# Patient Record
Sex: MALE | Race: WHITE | NOT HISPANIC OR LATINO
[De-identification: names, ages, dates, MRNs, and addresses within clinical notes are randomized per-mention and may not be internally consistent; named-entity substitution may affect disease eponyms.]

---

## 2017-05-26 ENCOUNTER — APPOINTMENT (OUTPATIENT)
Dept: OTOLARYNGOLOGY | Facility: CLINIC | Age: 82
End: 2017-05-26

## 2017-05-26 VITALS
BODY MASS INDEX: 28.63 KG/M2 | WEIGHT: 200 LBS | DIASTOLIC BLOOD PRESSURE: 77 MMHG | HEIGHT: 70 IN | HEART RATE: 72 BPM | SYSTOLIC BLOOD PRESSURE: 112 MMHG

## 2017-08-04 ENCOUNTER — TRANSCRIPTION ENCOUNTER (OUTPATIENT)
Age: 82
End: 2017-08-04

## 2017-08-08 ENCOUNTER — APPOINTMENT (OUTPATIENT)
Dept: INTERNAL MEDICINE | Facility: CLINIC | Age: 82
End: 2017-08-08

## 2017-08-10 ENCOUNTER — APPOINTMENT (OUTPATIENT)
Dept: HEART AND VASCULAR | Facility: CLINIC | Age: 82
End: 2017-08-10
Payer: MEDICARE

## 2017-08-10 VITALS
TEMPERATURE: 98.2 F | OXYGEN SATURATION: 96 % | WEIGHT: 198.9 LBS | BODY MASS INDEX: 28.54 KG/M2 | HEART RATE: 80 BPM | RESPIRATION RATE: 14 BRPM | DIASTOLIC BLOOD PRESSURE: 89 MMHG | SYSTOLIC BLOOD PRESSURE: 149 MMHG

## 2017-08-10 DIAGNOSIS — I45.4 NONSPECIFIC INTRAVENTRICULAR BLOCK: ICD-10-CM

## 2017-08-10 PROCEDURE — 94644 CONT INHLJ TX 1ST HOUR: CPT

## 2017-08-10 PROCEDURE — 99203 OFFICE O/P NEW LOW 30 MIN: CPT | Mod: 25

## 2017-08-10 PROCEDURE — 93000 ELECTROCARDIOGRAM COMPLETE: CPT

## 2017-12-13 ENCOUNTER — APPOINTMENT (OUTPATIENT)
Dept: OTOLARYNGOLOGY | Facility: CLINIC | Age: 82
End: 2017-12-13
Payer: MEDICARE

## 2017-12-13 VITALS
HEART RATE: 56 BPM | SYSTOLIC BLOOD PRESSURE: 162 MMHG | BODY MASS INDEX: 29.2 KG/M2 | WEIGHT: 204 LBS | DIASTOLIC BLOOD PRESSURE: 78 MMHG | HEIGHT: 70 IN

## 2017-12-13 PROCEDURE — 92550 TYMPANOMETRY & REFLEX THRESH: CPT

## 2017-12-13 PROCEDURE — 99214 OFFICE O/P EST MOD 30 MIN: CPT | Mod: 25

## 2017-12-13 PROCEDURE — 31575 DIAGNOSTIC LARYNGOSCOPY: CPT

## 2017-12-13 PROCEDURE — 92557 COMPREHENSIVE HEARING TEST: CPT

## 2018-12-19 ENCOUNTER — APPOINTMENT (OUTPATIENT)
Dept: OTOLARYNGOLOGY | Facility: CLINIC | Age: 83
End: 2018-12-19
Payer: MEDICARE

## 2018-12-19 VITALS
SYSTOLIC BLOOD PRESSURE: 140 MMHG | HEART RATE: 56 BPM | WEIGHT: 204 LBS | DIASTOLIC BLOOD PRESSURE: 80 MMHG | BODY MASS INDEX: 29.2 KG/M2 | HEIGHT: 70 IN

## 2018-12-19 DIAGNOSIS — J18.9 PNEUMONIA, UNSPECIFIED ORGANISM: ICD-10-CM

## 2018-12-19 DIAGNOSIS — H90.3 SENSORINEURAL HEARING LOSS, BILATERAL: ICD-10-CM

## 2018-12-19 DIAGNOSIS — S40.029A CONTUSION OF UNSPECIFIED UPPER ARM, INITIAL ENCOUNTER: ICD-10-CM

## 2018-12-19 PROCEDURE — 99214 OFFICE O/P EST MOD 30 MIN: CPT | Mod: 25

## 2018-12-19 PROCEDURE — 31575 DIAGNOSTIC LARYNGOSCOPY: CPT

## 2018-12-26 PROBLEM — J18.9 CAP (COMMUNITY ACQUIRED PNEUMONIA): Status: RESOLVED | Noted: 2017-08-13 | Resolved: 2018-12-26

## 2019-01-01 ENCOUNTER — APPOINTMENT (OUTPATIENT)
Dept: OTOLARYNGOLOGY | Facility: CLINIC | Age: 84
End: 2019-01-01
Payer: MEDICARE

## 2019-01-01 VITALS
HEART RATE: 75 BPM | HEIGHT: 70 IN | DIASTOLIC BLOOD PRESSURE: 81 MMHG | WEIGHT: 189 LBS | SYSTOLIC BLOOD PRESSURE: 132 MMHG | BODY MASS INDEX: 27.06 KG/M2

## 2019-01-01 DIAGNOSIS — C32.9 MALIGNANT NEOPLASM OF LARYNX, UNSPECIFIED: ICD-10-CM

## 2019-01-01 DIAGNOSIS — J06.9 ACUTE UPPER RESPIRATORY INFECTION, UNSPECIFIED: ICD-10-CM

## 2019-01-01 DIAGNOSIS — Z87.09 PERSONAL HISTORY OF OTHER DISEASES OF THE RESPIRATORY SYSTEM: ICD-10-CM

## 2019-01-01 DIAGNOSIS — K21.9 GASTRO-ESOPHAGEAL REFLUX DISEASE W/OUT ESOPHAGITIS: ICD-10-CM

## 2019-01-01 DIAGNOSIS — E04.1 NONTOXIC SINGLE THYROID NODULE: ICD-10-CM

## 2019-01-01 DIAGNOSIS — C43.9 MALIGNANT MELANOMA OF SKIN, UNSPECIFIED: ICD-10-CM

## 2019-01-01 DIAGNOSIS — K21.0 GASTRO-ESOPHAGEAL REFLUX DISEASE WITH ESOPHAGITIS: ICD-10-CM

## 2019-01-01 PROCEDURE — 31575 DIAGNOSTIC LARYNGOSCOPY: CPT

## 2019-01-01 PROCEDURE — 99214 OFFICE O/P EST MOD 30 MIN: CPT | Mod: 25

## 2019-01-01 RX ORDER — PREDNISONE 10 MG/1
10 TABLET ORAL
Qty: 30 | Refills: 0 | Status: COMPLETED | COMMUNITY
Start: 2019-03-06 | End: 2019-03-19

## 2019-01-01 RX ORDER — AMOXICILLIN AND CLAVULANATE POTASSIUM 500; 125 MG/1; MG/1
500-125 TABLET, FILM COATED ORAL
Qty: 10 | Refills: 4 | Status: COMPLETED | COMMUNITY
Start: 2019-01-25 | End: 2019-02-08

## 2019-01-11 ENCOUNTER — TRANSCRIPTION ENCOUNTER (OUTPATIENT)
Age: 84
End: 2019-01-11

## 2019-01-18 ENCOUNTER — TRANSCRIPTION ENCOUNTER (OUTPATIENT)
Age: 84
End: 2019-01-18

## 2019-01-25 ENCOUNTER — APPOINTMENT (OUTPATIENT)
Dept: INTERNAL MEDICINE | Facility: CLINIC | Age: 84
End: 2019-01-25
Payer: MEDICARE

## 2019-01-25 VITALS
DIASTOLIC BLOOD PRESSURE: 80 MMHG | SYSTOLIC BLOOD PRESSURE: 140 MMHG | HEART RATE: 65 BPM | OXYGEN SATURATION: 97 % | RESPIRATION RATE: 14 BRPM

## 2019-01-25 PROCEDURE — 99215 OFFICE O/P EST HI 40 MIN: CPT

## 2019-01-27 NOTE — HISTORY OF PRESENT ILLNESS
[de-identified] : 82 yo M with hx of throat CA s/p tx with RT, Afib, \par s/p urgent care eval 2 weeks ago for wheezing\par  Has right sided throat pain.  Saw Dr Champion last month- advised to gargle with hot water.\par Has been sleeping poorly , up 2 years\par reports a nebulizer did help\par  tried albuterol but it made him dizzy\par Quit tob 14 yrs go.

## 2019-01-27 NOTE — PHYSICAL EXAM
[No Acute Distress] : no acute distress [Well Nourished] : well nourished [Well Developed] : well developed [Supple] : supple [No Lymphadenopathy] : no lymphadenopathy [No Respiratory Distress] : no respiratory distress  [Clear to Auscultation] : lungs were clear to auscultation bilaterally [No Accessory Muscle Use] : no accessory muscle use [Normal Rate] : normal rate  [Regular Rhythm] : with a regular rhythm [Normal S1, S2] : normal S1 and S2 [Soft] : abdomen soft [Non Tender] : non-tender [Non-distended] : non-distended [No HSM] : no HSM [Normal Bowel Sounds] : normal bowel sounds [No Rash] : no rash [Normal Affect] : the affect was normal [Normal Insight/Judgement] : insight and judgment were intact [de-identified] : mild pharyngeal erythema [de-identified] : trace edema b/l le

## 2019-01-27 NOTE — PLAN
[FreeTextEntry1] : HTN - controlled on current regimen\par HLD cont statin\par Afib on amiodarone, ac with Eliquis\par Breathing discomfort- likely underlying emphysema- trial of flovent and monitor symptoms\par f/up 3 months time

## 2019-01-27 NOTE — REVIEW OF SYSTEMS
[Fatigue] : fatigue [Sore Throat] : sore throat [Joint Pain] : joint pain [Dizziness] : dizziness [Insomnia] : insomnia [Negative] : Gastrointestinal

## 2019-03-03 ENCOUNTER — FORM ENCOUNTER (OUTPATIENT)
Age: 84
End: 2019-03-03

## 2019-03-04 ENCOUNTER — APPOINTMENT (OUTPATIENT)
Dept: INTERNAL MEDICINE | Facility: CLINIC | Age: 84
End: 2019-03-04
Payer: MEDICARE

## 2019-03-04 ENCOUNTER — OUTPATIENT (OUTPATIENT)
Dept: OUTPATIENT SERVICES | Facility: HOSPITAL | Age: 84
LOS: 1 days | End: 2019-03-04
Payer: MEDICARE

## 2019-03-04 VITALS
HEIGHT: 70 IN | HEART RATE: 66 BPM | RESPIRATION RATE: 14 BRPM | DIASTOLIC BLOOD PRESSURE: 79 MMHG | WEIGHT: 204 LBS | SYSTOLIC BLOOD PRESSURE: 132 MMHG | BODY MASS INDEX: 29.2 KG/M2 | OXYGEN SATURATION: 97 % | TEMPERATURE: 98.1 F

## 2019-03-04 PROCEDURE — 71046 X-RAY EXAM CHEST 2 VIEWS: CPT

## 2019-03-04 PROCEDURE — 71046 X-RAY EXAM CHEST 2 VIEWS: CPT | Mod: 26

## 2019-03-04 PROCEDURE — 99214 OFFICE O/P EST MOD 30 MIN: CPT

## 2019-03-04 NOTE — HISTORY OF PRESENT ILLNESS
[FreeTextEntry1] : sob and fatigue [de-identified] : \par \par Has seen Dr Champion and has been to urgent care twice.  Has had a cough, fatigue., was taking mucinex,   CXR at urgent care was clear.  \par Having a lot of mucous.\par SLeeping a lot.  \par Recently saw Dr Parada and started on lasix 40mg daily. Labs performed.  \par Appetite is poor - Has lost 4 lbs.  over 3 months. \par \par

## 2019-03-04 NOTE — PLAN
[FreeTextEntry1] : Suspect a degree of underlying emphysema\par  - f/up Echo and f/up with Dr Parada\par  - send for CXR\par  - Pulmonary evaluation\par  - Labs reviewed  - outside of elev BNP - no sig findings\par  - ok to add ensure to daily meals - 1can for now\par

## 2019-03-04 NOTE — PHYSICAL EXAM
[No Acute Distress] : no acute distress [Well Nourished] : well nourished [Well Developed] : well developed [Normal Oropharynx] : the oropharynx was normal [Supple] : supple [No Lymphadenopathy] : no lymphadenopathy [No Respiratory Distress] : no respiratory distress  [Normal Rate] : normal rate  [Normal S1, S2] : normal S1 and S2 [Normal Affect] : the affect was normal [Normal Insight/Judgement] : insight and judgment were intact [de-identified] : coarse breath sounds b/l with dec breath sounds at bases [de-identified] : irreg irreg [de-identified] : 1+ edema right le  trace lle [de-identified] : abnormal gait unsteady

## 2019-03-04 NOTE — REVIEW OF SYSTEMS
[Fever] : no fever [Chills] : no chills [Fatigue] : fatigue [Recent Change In Weight] : ~T recent weight change [Cough] : cough [Dyspnea on Exertion] : dyspnea on exertion [Incontinence] : no incontinence [Frequency] : frequency [Joint Pain] : joint pain [Unsteady Walk] : ataxia [Negative] : Psychiatric

## 2019-03-06 ENCOUNTER — APPOINTMENT (OUTPATIENT)
Dept: PULMONOLOGY | Facility: CLINIC | Age: 84
End: 2019-03-06
Payer: MEDICARE

## 2019-03-06 VITALS
WEIGHT: 204 LBS | HEIGHT: 70 IN | DIASTOLIC BLOOD PRESSURE: 82 MMHG | TEMPERATURE: 97.7 F | SYSTOLIC BLOOD PRESSURE: 138 MMHG | OXYGEN SATURATION: 91 % | HEART RATE: 74 BPM | BODY MASS INDEX: 29.2 KG/M2

## 2019-03-06 PROCEDURE — 99204 OFFICE O/P NEW MOD 45 MIN: CPT | Mod: 25

## 2019-03-06 PROCEDURE — 94010 BREATHING CAPACITY TEST: CPT

## 2019-03-07 NOTE — HISTORY OF PRESENT ILLNESS
[FreeTextEntry1] : a lot of phlem  yellow,  was a smoker for hundred years.  .  poor history and wife is not that much better.  really cannot give a history, so history obtrained from the chart.  cardiac issues,  demential large history of smoking with mostly mucous and cough likley after a resp infection

## 2019-03-07 NOTE — REASON FOR VISIT
[Follow-Up] : a follow-up visit [FreeTextEntry1] : went to urgent care in january,  breathing was bad  gave him nebulizer

## 2019-03-07 NOTE — DISCUSSION/SUMMARY
[FreeTextEntry1] : likely just post viral cough \par \par surprisingly, no clear indica tion off cope and good oxygenation\par \par will give a 12 day course of prednisone\par \par does not appear that there is an ongoing in fection.

## 2019-03-07 NOTE — PHYSICAL EXAM
[Normal Oropharynx] : normal oropharynx [Neck Appearance] : the appearance of the neck was normal [Neck Cervical Mass (___cm)] : no neck mass was observed [Jugular Venous Distention Increased] : there was no jugular-venous distention [Thyroid Diffuse Enlargement] : the thyroid was not enlarged [Thyroid Nodule] : there were no palpable thyroid nodules [Kyphosis] : kyphosis [Nail Clubbing] : no clubbing of the fingernails [Cyanosis, Localized] : no localized cyanosis [Petechial Hemorrhages (___cm)] : no petechial hemorrhages [] : no ischemic changes [No Venous Stasis] : no venous stasis [Deep Tendon Reflexes (DTR)] : deep tendon reflexes were 2+ and symmetric [Sensation] : the sensory exam was normal to light touch and pinprick [No Focal Deficits] : no focal deficits [FreeTextEntry1] : dmentia

## 2019-03-07 NOTE — PROCEDURE
[FreeTextEntry1] : poor effor on spirometry but there is no pattern reflective of copd\par \par chest film eh, but no inifltrates or effusions

## 2019-12-26 PROBLEM — Z87.09 HISTORY OF ACUTE BRONCHITIS: Status: RESOLVED | Noted: 2019-03-06 | Resolved: 2019-01-01

## 2019-12-26 PROBLEM — K21.0 CHRONIC REFLUX ESOPHAGITIS: Status: ACTIVE | Noted: 2017-12-14

## 2019-12-26 PROBLEM — J06.9 ACUTE URI: Status: RESOLVED | Noted: 2018-12-26 | Resolved: 2019-01-01

## 2019-12-26 NOTE — PHYSICAL EXAM
[] : septum deviated to the left [Midline] : trachea located in midline position [Laryngoscopy Performed] : laryngoscopy was performed, see procedure section for findings [Normal] : no neck adenopathy [FreeTextEntry1] : Voice baseline hoarseness.  No stridor. [de-identified] : 1+ bilateral

## 2019-12-26 NOTE — CONSULT LETTER
[Dear  ___] : Dear  [unfilled], [Courtesy Letter:] : I had the pleasure of seeing your patient, [unfilled], in my office today. [Consult Closing:] : Thank you very much for allowing me to participate in the care of this patient.  If you have any questions, please do not hesitate to contact me. [Sincerely,] : Sincerely, [FreeTextEntry2] : Stu Forbes MD\par [address]\par [address]\[place] [address] [FreeTextEntry1] : \par \par Enclosed please find my office notes for December 18, 2019. \par \par \par \par \par \par  [FreeTextEntry3] : \par Love Champion MD \par Otolaryngology, Head and Neck Surgery \par Residency site , [facility] \par

## 2019-12-26 NOTE — DATA REVIEWED
[de-identified] : \par AUDIOGRAM (2017)\par BILATERAL Mild to severe SNHL\par (compared to last  10/24/14, hearing levels stable AU)\par Tympanograms  A  bilateral    \par Word recognition 90%  bilateral \par

## 2019-12-26 NOTE — ASSESSMENT
[FreeTextEntry1] : Mr. NAIK had the following issues addressed today:\par \par 1.) No evidence of recurrent laryngeal SCCa at 6 1/2 years post RT.  No evidence of new primary in H&N. \par 2.) Reflux not frequently symptomatic, per patient\par 3.) Hx of melanoma on back\par 4.) Hx of right thyroid nodule, 2.3 cm\par \par Plan:\par -- Continue annual surveillance\par -- F/up with dermatologist\par -- US thyroid to assess nodule\par \par Return in 1 year

## 2019-12-26 NOTE — HISTORY OF PRESENT ILLNESS
[de-identified] : Mr. NAIK  is a 86 year old M being seen today in f/u for laryngeal cancer.\par He was accompanied by his wife who contributed to history.\par \par He is still frequent clear mucus in his throat but less than in past.\par Denies trouble breathing through his nose. No trouble swallowing, voice change or throat pain.\par No heartburn.  No reflux meds. 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 Chong in early 2017.\par Has not gone to dermatologist for almost a year now.\par \par \par Right posterior thyroid nodule, 2.5 x 2.3 x 2.3 CM, was evaluated in 2013 and he had benign FNA.\par Nodule has not been palpate in past.\par Euthyroid.  No FH thyroid CA.\par \par No major medical history changes since last year. \par Grandson was recently diagnosed with autism.\par

## 2019-12-26 NOTE — PROCEDURE
[de-identified] : \par Indication: GERD, laryngeal SCCa\par Topical Honorio-Synephrine and lidocaine 2% were applied to the nasal cavities.\par Flexible laryngoscopy was performed via the right nasal cavity and revealed the following:\par  -- Nasopharynx had no mass or exudate.\par  -- Base of tongue was symmetric and not enlarged.\par  -- Vallecula was clear\par  -- Epiglottis mild edema.  Both aryepiglottic folds and both false vocal folds were normal\par  -- Arytenoids both with mild edema and no erythema \par  -- True vocal folds were fully mobile and without lesions; mild edema both TVFs \par  -- Minimal clear mucus in hypopharynx.\par  -- Post cricoid area was clear.\par  -- Interarytenoid edema was present\par \par The patient tolerated the procedure well. \par

## 2020-01-01 ENCOUNTER — APPOINTMENT (OUTPATIENT)
Dept: OPHTHALMOLOGY | Facility: CLINIC | Age: 85
End: 2020-01-01
Payer: MEDICARE

## 2020-01-01 ENCOUNTER — APPOINTMENT (OUTPATIENT)
Dept: UROLOGY | Facility: CLINIC | Age: 85
End: 2020-01-01
Payer: MEDICARE

## 2020-01-01 ENCOUNTER — OUTPATIENT (OUTPATIENT)
Dept: OUTPATIENT SERVICES | Facility: HOSPITAL | Age: 85
LOS: 1 days | End: 2020-01-01
Payer: MEDICARE

## 2020-01-01 ENCOUNTER — APPOINTMENT (OUTPATIENT)
Dept: OPHTHALMOLOGY | Facility: CLINIC | Age: 85
End: 2020-01-01

## 2020-01-01 ENCOUNTER — FORM ENCOUNTER (OUTPATIENT)
Age: 85
End: 2020-01-01

## 2020-01-01 ENCOUNTER — NON-APPOINTMENT (OUTPATIENT)
Age: 85
End: 2020-01-01

## 2020-01-01 ENCOUNTER — TRANSCRIPTION ENCOUNTER (OUTPATIENT)
Age: 85
End: 2020-01-01

## 2020-01-01 ENCOUNTER — RESULT REVIEW (OUTPATIENT)
Age: 85
End: 2020-01-01

## 2020-01-01 ENCOUNTER — APPOINTMENT (OUTPATIENT)
Dept: HEART AND VASCULAR | Facility: CLINIC | Age: 85
End: 2020-01-01
Payer: MEDICARE

## 2020-01-01 ENCOUNTER — INPATIENT (INPATIENT)
Facility: HOSPITAL | Age: 85
LOS: 3 days | Discharge: HOSPICE | DRG: 180 | End: 2020-11-06
Attending: HOSPITALIST | Admitting: HOSPITALIST
Payer: MEDICARE

## 2020-01-01 ENCOUNTER — APPOINTMENT (OUTPATIENT)
Dept: INTERNAL MEDICINE | Facility: CLINIC | Age: 85
End: 2020-01-01

## 2020-01-01 ENCOUNTER — APPOINTMENT (OUTPATIENT)
Dept: INTERNAL MEDICINE | Facility: CLINIC | Age: 85
End: 2020-01-01
Payer: MEDICARE

## 2020-01-01 ENCOUNTER — APPOINTMENT (OUTPATIENT)
Dept: OPHTHALMOLOGY | Facility: AMBULATORY SURGERY CENTER | Age: 85
End: 2020-01-01

## 2020-01-01 ENCOUNTER — APPOINTMENT (OUTPATIENT)
Dept: HEART AND VASCULAR | Facility: CLINIC | Age: 85
End: 2020-01-01

## 2020-01-01 ENCOUNTER — OUTPATIENT (OUTPATIENT)
Dept: OUTPATIENT SERVICES | Facility: HOSPITAL | Age: 85
LOS: 1 days | Discharge: HOME | End: 2020-01-01
Payer: MEDICARE

## 2020-01-01 ENCOUNTER — APPOINTMENT (OUTPATIENT)
Dept: OPHTHALMOLOGY | Facility: AMBULATORY SURGERY CENTER | Age: 85
End: 2020-01-01
Payer: MEDICARE

## 2020-01-01 ENCOUNTER — APPOINTMENT (OUTPATIENT)
Dept: UROLOGY | Facility: CLINIC | Age: 85
End: 2020-01-01

## 2020-01-01 ENCOUNTER — APPOINTMENT (OUTPATIENT)
Dept: DISASTER EMERGENCY | Facility: CLINIC | Age: 85
End: 2020-01-01

## 2020-01-01 ENCOUNTER — APPOINTMENT (OUTPATIENT)
Dept: PULMONOLOGY | Facility: CLINIC | Age: 85
End: 2020-01-01

## 2020-01-01 ENCOUNTER — APPOINTMENT (OUTPATIENT)
Dept: ULTRASOUND IMAGING | Facility: HOSPITAL | Age: 85
End: 2020-01-01
Payer: MEDICARE

## 2020-01-01 ENCOUNTER — EMERGENCY (EMERGENCY)
Facility: HOSPITAL | Age: 85
LOS: 1 days | Discharge: HOME | End: 2020-01-01
Attending: EMERGENCY MEDICINE | Admitting: EMERGENCY MEDICINE
Payer: MEDICARE

## 2020-01-01 VITALS
WEIGHT: 175 LBS | RESPIRATION RATE: 14 BRPM | SYSTOLIC BLOOD PRESSURE: 144 MMHG | HEIGHT: 70 IN | TEMPERATURE: 98.6 F | BODY MASS INDEX: 25.05 KG/M2 | DIASTOLIC BLOOD PRESSURE: 70 MMHG

## 2020-01-01 VITALS
DIASTOLIC BLOOD PRESSURE: 54 MMHG | OXYGEN SATURATION: 90 % | TEMPERATURE: 98 F | HEART RATE: 50 BPM | RESPIRATION RATE: 17 BRPM | SYSTOLIC BLOOD PRESSURE: 102 MMHG

## 2020-01-01 VITALS
SYSTOLIC BLOOD PRESSURE: 130 MMHG | HEIGHT: 70 IN | DIASTOLIC BLOOD PRESSURE: 76 MMHG | HEART RATE: 64 BPM | WEIGHT: 185 LBS | TEMPERATURE: 97 F | OXYGEN SATURATION: 97 % | RESPIRATION RATE: 14 BRPM | BODY MASS INDEX: 26.48 KG/M2

## 2020-01-01 VITALS
SYSTOLIC BLOOD PRESSURE: 127 MMHG | DIASTOLIC BLOOD PRESSURE: 69 MMHG | WEIGHT: 189 LBS | HEART RATE: 65 BPM | BODY MASS INDEX: 27.06 KG/M2 | HEIGHT: 70 IN

## 2020-01-01 VITALS
WEIGHT: 179.9 LBS | DIASTOLIC BLOOD PRESSURE: 70 MMHG | HEIGHT: 71 IN | RESPIRATION RATE: 18 BRPM | TEMPERATURE: 98 F | SYSTOLIC BLOOD PRESSURE: 123 MMHG | OXYGEN SATURATION: 96 % | HEART RATE: 66 BPM

## 2020-01-01 VITALS
OXYGEN SATURATION: 98 % | BODY MASS INDEX: 26.05 KG/M2 | SYSTOLIC BLOOD PRESSURE: 126 MMHG | RESPIRATION RATE: 14 BRPM | TEMPERATURE: 97.6 F | HEIGHT: 70 IN | DIASTOLIC BLOOD PRESSURE: 62 MMHG | HEART RATE: 79 BPM | WEIGHT: 182 LBS

## 2020-01-01 VITALS
WEIGHT: 171.96 LBS | TEMPERATURE: 98 F | SYSTOLIC BLOOD PRESSURE: 137 MMHG | HEIGHT: 71 IN | HEART RATE: 102 BPM | OXYGEN SATURATION: 95 % | RESPIRATION RATE: 20 BRPM | DIASTOLIC BLOOD PRESSURE: 76 MMHG

## 2020-01-01 VITALS — DIASTOLIC BLOOD PRESSURE: 69 MMHG | HEART RATE: 71 BPM | SYSTOLIC BLOOD PRESSURE: 118 MMHG | TEMPERATURE: 97.8 F

## 2020-01-01 VITALS
SYSTOLIC BLOOD PRESSURE: 131 MMHG | RESPIRATION RATE: 18 BRPM | HEART RATE: 86 BPM | OXYGEN SATURATION: 96 % | DIASTOLIC BLOOD PRESSURE: 75 MMHG | TEMPERATURE: 99 F

## 2020-01-01 VITALS
OXYGEN SATURATION: 98 % | DIASTOLIC BLOOD PRESSURE: 71 MMHG | SYSTOLIC BLOOD PRESSURE: 127 MMHG | TEMPERATURE: 97.3 F | HEART RATE: 77 BPM

## 2020-01-01 DIAGNOSIS — R63.0 ANOREXIA: ICD-10-CM

## 2020-01-01 DIAGNOSIS — I42.9 CARDIOMYOPATHY, UNSPECIFIED: ICD-10-CM

## 2020-01-01 DIAGNOSIS — I44.7 LEFT BUNDLE-BRANCH BLOCK, UNSPECIFIED: ICD-10-CM

## 2020-01-01 DIAGNOSIS — I48.91 UNSPECIFIED ATRIAL FIBRILLATION: ICD-10-CM

## 2020-01-01 DIAGNOSIS — R91.8 OTHER NONSPECIFIC ABNORMAL FINDING OF LUNG FIELD: ICD-10-CM

## 2020-01-01 DIAGNOSIS — K76.9 LIVER DISEASE, UNSPECIFIED: ICD-10-CM

## 2020-01-01 DIAGNOSIS — R63.4 ABNORMAL WEIGHT LOSS: ICD-10-CM

## 2020-01-01 DIAGNOSIS — R06.00 DYSPNEA, UNSPECIFIED: ICD-10-CM

## 2020-01-01 DIAGNOSIS — I11.0 HYPERTENSIVE HEART DISEASE WITH HEART FAILURE: ICD-10-CM

## 2020-01-01 DIAGNOSIS — R63.8 OTHER SYMPTOMS AND SIGNS CONCERNING FOOD AND FLUID INTAKE: ICD-10-CM

## 2020-01-01 DIAGNOSIS — Z66 DO NOT RESUSCITATE: ICD-10-CM

## 2020-01-01 DIAGNOSIS — R10.2 PELVIC AND PERINEAL PAIN: ICD-10-CM

## 2020-01-01 DIAGNOSIS — I50.22 CHRONIC SYSTOLIC (CONGESTIVE) HEART FAILURE: ICD-10-CM

## 2020-01-01 DIAGNOSIS — J90 PLEURAL EFFUSION, NOT ELSEWHERE CLASSIFIED: ICD-10-CM

## 2020-01-01 DIAGNOSIS — I10 ESSENTIAL (PRIMARY) HYPERTENSION: ICD-10-CM

## 2020-01-01 DIAGNOSIS — Z00.00 ENCOUNTER FOR GENERAL ADULT MEDICAL EXAMINATION W/OUT ABNORMAL FINDINGS: ICD-10-CM

## 2020-01-01 DIAGNOSIS — E44.1 MILD PROTEIN-CALORIE MALNUTRITION: ICD-10-CM

## 2020-01-01 DIAGNOSIS — G89.3 NEOPLASM RELATED PAIN (ACUTE) (CHRONIC): ICD-10-CM

## 2020-01-01 DIAGNOSIS — R21 RASH AND OTHER NONSPECIFIC SKIN ERUPTION: ICD-10-CM

## 2020-01-01 DIAGNOSIS — E87.1 HYPO-OSMOLALITY AND HYPONATREMIA: ICD-10-CM

## 2020-01-01 DIAGNOSIS — R53.83 OTHER FATIGUE: ICD-10-CM

## 2020-01-01 DIAGNOSIS — B35.6 TINEA CRURIS: ICD-10-CM

## 2020-01-01 DIAGNOSIS — M54.9 DORSALGIA, UNSPECIFIED: ICD-10-CM

## 2020-01-01 DIAGNOSIS — Z87.891 PERSONAL HISTORY OF NICOTINE DEPENDENCE: ICD-10-CM

## 2020-01-01 DIAGNOSIS — E78.5 HYPERLIPIDEMIA, UNSPECIFIED: ICD-10-CM

## 2020-01-01 DIAGNOSIS — J96.01 ACUTE RESPIRATORY FAILURE WITH HYPOXIA: ICD-10-CM

## 2020-01-01 DIAGNOSIS — J91.0 MALIGNANT PLEURAL EFFUSION: ICD-10-CM

## 2020-01-01 DIAGNOSIS — Z51.5 ENCOUNTER FOR PALLIATIVE CARE: ICD-10-CM

## 2020-01-01 DIAGNOSIS — R74.8 ABNORMAL LEVELS OF OTHER SERUM ENZYMES: ICD-10-CM

## 2020-01-01 DIAGNOSIS — C34.90 MALIGNANT NEOPLASM OF UNSPECIFIED PART OF UNSPECIFIED BRONCHUS OR LUNG: ICD-10-CM

## 2020-01-01 DIAGNOSIS — Z79.01 LONG TERM (CURRENT) USE OF ANTICOAGULANTS: ICD-10-CM

## 2020-01-01 DIAGNOSIS — Z71.89 OTHER SPECIFIED COUNSELING: ICD-10-CM

## 2020-01-01 DIAGNOSIS — I49.3 VENTRICULAR PREMATURE DEPOLARIZATION: ICD-10-CM

## 2020-01-01 DIAGNOSIS — Z01.818 ENCOUNTER FOR OTHER PREPROCEDURAL EXAMINATION: ICD-10-CM

## 2020-01-01 DIAGNOSIS — I48.19 OTHER PERSISTENT ATRIAL FIBRILLATION: ICD-10-CM

## 2020-01-01 DIAGNOSIS — R35.1 BENIGN PROSTATIC HYPERPLASIA WITH LOWER URINARY TRACT SYMPMS: ICD-10-CM

## 2020-01-01 DIAGNOSIS — R53.1 WEAKNESS: ICD-10-CM

## 2020-01-01 DIAGNOSIS — Z78.9 OTHER SPECIFIED HEALTH STATUS: ICD-10-CM

## 2020-01-01 DIAGNOSIS — N40.1 BENIGN PROSTATIC HYPERPLASIA WITH LOWER URINARY TRACT SYMPMS: ICD-10-CM

## 2020-01-01 DIAGNOSIS — C78.00 SECONDARY MALIGNANT NEOPLASM OF UNSPECIFIED LUNG: ICD-10-CM

## 2020-01-01 LAB
AFP-TM SERPL-MCNC: <1.8 NG/ML — SIGNIFICANT CHANGE UP
ALBUMIN FLD-MCNC: 2.8 G/DL — SIGNIFICANT CHANGE UP
ALBUMIN SERPL ELPH-MCNC: 3.2 G/DL — LOW (ref 3.3–5)
ALBUMIN SERPL ELPH-MCNC: 3.2 G/DL — LOW (ref 3.3–5)
ALBUMIN SERPL ELPH-MCNC: 3.3 G/DL — SIGNIFICANT CHANGE UP (ref 3.3–5)
ALBUMIN SERPL ELPH-MCNC: 3.8 G/DL — SIGNIFICANT CHANGE UP (ref 3.3–5)
ALBUMIN SERPL ELPH-MCNC: 3.9 G/DL
ALP BLD-CCNC: 271 U/L
ALP SERPL-CCNC: 112 U/L — SIGNIFICANT CHANGE UP (ref 40–120)
ALP SERPL-CCNC: 330 U/L — HIGH (ref 40–120)
ALP SERPL-CCNC: 335 U/L — HIGH (ref 40–120)
ALP SERPL-CCNC: 369 U/L — HIGH (ref 40–120)
ALT FLD-CCNC: 11 U/L — SIGNIFICANT CHANGE UP (ref 10–45)
ALT FLD-CCNC: 21 U/L — SIGNIFICANT CHANGE UP (ref 10–45)
ALT FLD-CCNC: 21 U/L — SIGNIFICANT CHANGE UP (ref 10–45)
ALT FLD-CCNC: 22 U/L — SIGNIFICANT CHANGE UP (ref 10–45)
ALT SERPL-CCNC: 21 U/L
ANION GAP SERPL CALC-SCNC: 12 MMOL/L — SIGNIFICANT CHANGE UP (ref 5–17)
ANION GAP SERPL CALC-SCNC: 13 MMOL/L
ANION GAP SERPL CALC-SCNC: 14 MMOL/L — SIGNIFICANT CHANGE UP (ref 5–17)
ANION GAP SERPL CALC-SCNC: 15 MMOL/L — SIGNIFICANT CHANGE UP (ref 5–17)
ANION GAP SERPL CALC-SCNC: 16 MMOL/L
ANION GAP SERPL CALC-SCNC: 16 MMOL/L — SIGNIFICANT CHANGE UP (ref 5–17)
ANION GAP SERPL CALC-SCNC: 17 MMOL/L — SIGNIFICANT CHANGE UP (ref 5–17)
APPEARANCE: CLEAR
APTT BLD: 28.7 SEC — SIGNIFICANT CHANGE UP (ref 27.5–35.5)
APTT BLD: 30.2 SEC — SIGNIFICANT CHANGE UP (ref 27.5–35.5)
AST SERPL-CCNC: 25 U/L — SIGNIFICANT CHANGE UP (ref 10–40)
AST SERPL-CCNC: 34 U/L — SIGNIFICANT CHANGE UP (ref 10–40)
AST SERPL-CCNC: 36 U/L — SIGNIFICANT CHANGE UP (ref 10–40)
AST SERPL-CCNC: 36 U/L — SIGNIFICANT CHANGE UP (ref 10–40)
AST SERPL-CCNC: 38 U/L
B PERT IGG+IGM PNL SER: SIGNIFICANT CHANGE UP
B-OH-BUTYR SERPL-SCNC: 0.6 MMOL/L — HIGH
BASOPHILS # BLD AUTO: 0.03 K/UL — SIGNIFICANT CHANGE UP (ref 0–0.2)
BASOPHILS # BLD AUTO: 0.04 K/UL — SIGNIFICANT CHANGE UP (ref 0–0.2)
BASOPHILS # BLD AUTO: 0.05 K/UL
BASOPHILS # BLD AUTO: 0.05 K/UL — SIGNIFICANT CHANGE UP (ref 0–0.2)
BASOPHILS NFR BLD AUTO: 0.2 % — SIGNIFICANT CHANGE UP (ref 0–2)
BASOPHILS NFR BLD AUTO: 0.3 % — SIGNIFICANT CHANGE UP (ref 0–2)
BASOPHILS NFR BLD AUTO: 0.4 % — SIGNIFICANT CHANGE UP (ref 0–2)
BASOPHILS NFR BLD AUTO: 0.5 %
BILIRUB SERPL-MCNC: 0.4 MG/DL
BILIRUB SERPL-MCNC: 0.4 MG/DL — SIGNIFICANT CHANGE UP (ref 0.2–1.2)
BILIRUB SERPL-MCNC: 0.5 MG/DL — SIGNIFICANT CHANGE UP (ref 0.2–1.2)
BILIRUBIN URINE: NEGATIVE
BLOOD URINE: NEGATIVE
BUN SERPL-MCNC: 15 MG/DL — SIGNIFICANT CHANGE UP (ref 7–23)
BUN SERPL-MCNC: 16 MG/DL — SIGNIFICANT CHANGE UP (ref 7–23)
BUN SERPL-MCNC: 18 MG/DL — SIGNIFICANT CHANGE UP (ref 7–23)
BUN SERPL-MCNC: 18 MG/DL — SIGNIFICANT CHANGE UP (ref 7–23)
BUN SERPL-MCNC: 19 MG/DL
BUN SERPL-MCNC: 22 MG/DL
BUN SERPL-MCNC: 22 MG/DL — SIGNIFICANT CHANGE UP (ref 7–23)
CALCIUM SERPL-MCNC: 9 MG/DL — SIGNIFICANT CHANGE UP (ref 8.4–10.5)
CALCIUM SERPL-MCNC: 9.2 MG/DL
CALCIUM SERPL-MCNC: 9.2 MG/DL — SIGNIFICANT CHANGE UP (ref 8.4–10.5)
CALCIUM SERPL-MCNC: 9.2 MG/DL — SIGNIFICANT CHANGE UP (ref 8.4–10.5)
CALCIUM SERPL-MCNC: 9.4 MG/DL — SIGNIFICANT CHANGE UP (ref 8.4–10.5)
CALCIUM SERPL-MCNC: 9.6 MG/DL — SIGNIFICANT CHANGE UP (ref 8.4–10.5)
CALCIUM SERPL-MCNC: 9.7 MG/DL
CHLORIDE SERPL-SCNC: 101 MMOL/L — SIGNIFICANT CHANGE UP (ref 96–108)
CHLORIDE SERPL-SCNC: 104 MMOL/L
CHLORIDE SERPL-SCNC: 93 MMOL/L — LOW (ref 96–108)
CHLORIDE SERPL-SCNC: 94 MMOL/L — LOW (ref 96–108)
CHLORIDE SERPL-SCNC: 94 MMOL/L — LOW (ref 96–108)
CHLORIDE SERPL-SCNC: 96 MMOL/L
CHLORIDE SERPL-SCNC: 96 MMOL/L — SIGNIFICANT CHANGE UP (ref 96–108)
CHOLEST FLD-MCNC: 55 MG/DL — SIGNIFICANT CHANGE UP
CO2 SERPL-SCNC: 24 MMOL/L — SIGNIFICANT CHANGE UP (ref 22–31)
CO2 SERPL-SCNC: 25 MMOL/L
CO2 SERPL-SCNC: 25 MMOL/L — SIGNIFICANT CHANGE UP (ref 22–31)
CO2 SERPL-SCNC: 26 MMOL/L
CO2 SERPL-SCNC: 26 MMOL/L — SIGNIFICANT CHANGE UP (ref 22–31)
CO2 SERPL-SCNC: 27 MMOL/L — SIGNIFICANT CHANGE UP (ref 22–31)
CO2 SERPL-SCNC: 28 MMOL/L — SIGNIFICANT CHANGE UP (ref 22–31)
COLOR FLD: YELLOW — SIGNIFICANT CHANGE UP
COLOR: YELLOW
COMMENT - FLUIDS: SIGNIFICANT CHANGE UP
CREAT FLD-MCNC: 0.86 MG/DL — SIGNIFICANT CHANGE UP
CREAT SERPL-MCNC: 0.77 MG/DL — SIGNIFICANT CHANGE UP (ref 0.5–1.3)
CREAT SERPL-MCNC: 0.79 MG/DL — SIGNIFICANT CHANGE UP (ref 0.5–1.3)
CREAT SERPL-MCNC: 0.85 MG/DL — SIGNIFICANT CHANGE UP (ref 0.5–1.3)
CREAT SERPL-MCNC: 0.91 MG/DL
CREAT SERPL-MCNC: 0.94 MG/DL — SIGNIFICANT CHANGE UP (ref 0.5–1.3)
CREAT SERPL-MCNC: 1 MG/DL — SIGNIFICANT CHANGE UP (ref 0.5–1.3)
CREAT SERPL-MCNC: 1.28 MG/DL
CULTURE RESULTS: NO GROWTH — SIGNIFICANT CHANGE UP
EOSINOPHIL # BLD AUTO: 0.02 K/UL — SIGNIFICANT CHANGE UP (ref 0–0.5)
EOSINOPHIL # BLD AUTO: 0.05 K/UL
EOSINOPHIL NFR BLD AUTO: 0.1 % — SIGNIFICANT CHANGE UP (ref 0–6)
EOSINOPHIL NFR BLD AUTO: 0.2 % — SIGNIFICANT CHANGE UP (ref 0–6)
EOSINOPHIL NFR BLD AUTO: 0.2 % — SIGNIFICANT CHANGE UP (ref 0–6)
EOSINOPHIL NFR BLD AUTO: 0.5 %
FLUID INTAKE SUBSTANCE CLASS: SIGNIFICANT CHANGE UP
FLUID SEGMENTED GRANULOCYTES: 48 % — SIGNIFICANT CHANGE UP
GGT SERPL-CCNC: 172 U/L
GLUCOSE FLD-MCNC: 75 MG/DL — SIGNIFICANT CHANGE UP
GLUCOSE QUALITATIVE U: NEGATIVE
GLUCOSE SERPL-MCNC: 101 MG/DL — HIGH (ref 70–99)
GLUCOSE SERPL-MCNC: 106 MG/DL — HIGH (ref 70–99)
GLUCOSE SERPL-MCNC: 108 MG/DL
GLUCOSE SERPL-MCNC: 111 MG/DL — HIGH (ref 70–99)
GLUCOSE SERPL-MCNC: 124 MG/DL — HIGH (ref 70–99)
GLUCOSE SERPL-MCNC: 147 MG/DL
GLUCOSE SERPL-MCNC: 96 MG/DL — SIGNIFICANT CHANGE UP (ref 70–99)
GRAM STN FLD: SIGNIFICANT CHANGE UP
HAV IGM SER-ACNC: SIGNIFICANT CHANGE UP
HBV CORE AB SER-ACNC: SIGNIFICANT CHANGE UP
HBV CORE IGM SER-ACNC: SIGNIFICANT CHANGE UP
HBV SURFACE AB SER-ACNC: SIGNIFICANT CHANGE UP
HBV SURFACE AG SER-ACNC: SIGNIFICANT CHANGE UP
HCT VFR BLD CALC: 36 % — LOW (ref 39–50)
HCT VFR BLD CALC: 38 % — LOW (ref 39–50)
HCT VFR BLD CALC: 38.1 % — LOW (ref 39–50)
HCT VFR BLD CALC: 38.2 %
HCT VFR BLD CALC: 39.3 % — SIGNIFICANT CHANGE UP (ref 39–50)
HCV AB S/CO SERPL IA: 0.05 S/CO — SIGNIFICANT CHANGE UP
HCV AB SERPL-IMP: SIGNIFICANT CHANGE UP
HGB BLD-MCNC: 12.3 G/DL — LOW (ref 13–17)
HGB BLD-MCNC: 12.4 G/DL
HGB BLD-MCNC: 12.7 G/DL — LOW (ref 13–17)
HGB BLD-MCNC: 12.7 G/DL — LOW (ref 13–17)
HGB BLD-MCNC: 13 G/DL — SIGNIFICANT CHANGE UP (ref 13–17)
IMM GRANULOCYTES NFR BLD AUTO: 0.4 %
IMM GRANULOCYTES NFR BLD AUTO: 0.4 % — SIGNIFICANT CHANGE UP (ref 0–1.5)
IMM GRANULOCYTES NFR BLD AUTO: 0.6 % — SIGNIFICANT CHANGE UP (ref 0–1.5)
IMM GRANULOCYTES NFR BLD AUTO: 0.6 % — SIGNIFICANT CHANGE UP (ref 0–1.5)
INR BLD: 1.48 — HIGH (ref 0.88–1.16)
INR BLD: 1.6 — HIGH (ref 0.88–1.16)
KETONES URINE: NEGATIVE
LACTATE SERPL-SCNC: 1.4 MMOL/L — SIGNIFICANT CHANGE UP (ref 0.5–2)
LACTATE SERPL-SCNC: 1.7 MMOL/L — SIGNIFICANT CHANGE UP (ref 0.5–2)
LDH SERPL L TO P-CCNC: 954 U/L — SIGNIFICANT CHANGE UP
LDH SERPL-CCNC: 300 U/L
LDH1 CFR SERPL ELPH: 15 %
LDH2 CFR SERPL ELPH: 33 %
LDH3 CFR SERPL ELPH: 33 %
LDH4 CFR SERPL ELPH: 13 %
LDH5 CFR SERPL ELPH: 6 %
LEUKOCYTE ESTERASE URINE: NEGATIVE
LYMPHOCYTES # BLD AUTO: 0.76 K/UL — LOW (ref 1–3.3)
LYMPHOCYTES # BLD AUTO: 0.89 K/UL — LOW (ref 1–3.3)
LYMPHOCYTES # BLD AUTO: 1 K/UL — SIGNIFICANT CHANGE UP (ref 1–3.3)
LYMPHOCYTES # BLD AUTO: 1.2 K/UL
LYMPHOCYTES # BLD AUTO: 6 % — LOW (ref 13–44)
LYMPHOCYTES # BLD AUTO: 6.6 % — LOW (ref 13–44)
LYMPHOCYTES # BLD AUTO: 8.1 % — LOW (ref 13–44)
LYMPHOCYTES # FLD: 27 % — SIGNIFICANT CHANGE UP
LYMPHOCYTES NFR BLD AUTO: 12.4 %
MAGNESIUM SERPL-MCNC: 1.8 MG/DL — SIGNIFICANT CHANGE UP (ref 1.6–2.6)
MAGNESIUM SERPL-MCNC: 1.8 MG/DL — SIGNIFICANT CHANGE UP (ref 1.6–2.6)
MAGNESIUM SERPL-MCNC: 2 MG/DL — SIGNIFICANT CHANGE UP (ref 1.6–2.6)
MAN DIFF?: NORMAL
MCHC RBC-ENTMCNC: 28.7 PG — SIGNIFICANT CHANGE UP (ref 27–34)
MCHC RBC-ENTMCNC: 28.9 PG — SIGNIFICANT CHANGE UP (ref 27–34)
MCHC RBC-ENTMCNC: 29.1 PG — SIGNIFICANT CHANGE UP (ref 27–34)
MCHC RBC-ENTMCNC: 29.5 PG
MCHC RBC-ENTMCNC: 30.5 PG — SIGNIFICANT CHANGE UP (ref 27–34)
MCHC RBC-ENTMCNC: 32.5 GM/DL
MCHC RBC-ENTMCNC: 33.1 GM/DL — SIGNIFICANT CHANGE UP (ref 32–36)
MCHC RBC-ENTMCNC: 33.3 GM/DL — SIGNIFICANT CHANGE UP (ref 32–36)
MCHC RBC-ENTMCNC: 33.4 GM/DL — SIGNIFICANT CHANGE UP (ref 32–36)
MCHC RBC-ENTMCNC: 34.2 GM/DL — SIGNIFICANT CHANGE UP (ref 32–36)
MCV RBC AUTO: 85.3 FL — SIGNIFICANT CHANGE UP (ref 80–100)
MCV RBC AUTO: 86.2 FL — SIGNIFICANT CHANGE UP (ref 80–100)
MCV RBC AUTO: 86.6 FL — SIGNIFICANT CHANGE UP (ref 80–100)
MCV RBC AUTO: 90.7 FL
MCV RBC AUTO: 92.3 FL — SIGNIFICANT CHANGE UP (ref 80–100)
MITOCHONDRIA AB SER IF-ACNC: NORMAL
MONOCYTES # BLD AUTO: 1.01 K/UL
MONOCYTES # BLD AUTO: 1.01 K/UL — HIGH (ref 0–0.9)
MONOCYTES # BLD AUTO: 1.09 K/UL — HIGH (ref 0–0.9)
MONOCYTES # BLD AUTO: 1.12 K/UL — HIGH (ref 0–0.9)
MONOCYTES NFR BLD AUTO: 10.4 %
MONOCYTES NFR BLD AUTO: 7.5 % — SIGNIFICANT CHANGE UP (ref 2–14)
MONOCYTES NFR BLD AUTO: 8.6 % — SIGNIFICANT CHANGE UP (ref 2–14)
MONOCYTES NFR BLD AUTO: 9.1 % — SIGNIFICANT CHANGE UP (ref 2–14)
MONOS+MACROS # FLD: 25 % — SIGNIFICANT CHANGE UP
NEUTROPHILS # BLD AUTO: 10.07 K/UL — HIGH (ref 1.8–7.4)
NEUTROPHILS # BLD AUTO: 10.63 K/UL — HIGH (ref 1.8–7.4)
NEUTROPHILS # BLD AUTO: 11.47 K/UL — HIGH (ref 1.8–7.4)
NEUTROPHILS # BLD AUTO: 7.36 K/UL
NEUTROPHILS NFR BLD AUTO: 75.8 %
NEUTROPHILS NFR BLD AUTO: 81.7 % — HIGH (ref 43–77)
NEUTROPHILS NFR BLD AUTO: 84.2 % — HIGH (ref 43–77)
NEUTROPHILS NFR BLD AUTO: 85.2 % — HIGH (ref 43–77)
NIGHT BLUE STAIN TISS: SIGNIFICANT CHANGE UP
NITRITE URINE: NEGATIVE
NON-GYNECOLOGICAL CYTOLOGY STUDY: SIGNIFICANT CHANGE UP
NON-GYNECOLOGICAL CYTOLOGY STUDY: SIGNIFICANT CHANGE UP
NRBC # BLD: 0 /100 WBCS — SIGNIFICANT CHANGE UP (ref 0–0)
NT-PROBNP SERPL-MCNC: 4547 PG/ML
PH URINE: 6
PH, PLEURAL FLUID: 7.47 — SIGNIFICANT CHANGE UP
PHOSPHATE SERPL-MCNC: 3.5 MG/DL — SIGNIFICANT CHANGE UP (ref 2.5–4.5)
PHOSPHATE SERPL-MCNC: 3.6 MG/DL — SIGNIFICANT CHANGE UP (ref 2.5–4.5)
PHOSPHATE SERPL-MCNC: 3.8 MG/DL — SIGNIFICANT CHANGE UP (ref 2.5–4.5)
PLATELET # BLD AUTO: 305 K/UL — SIGNIFICANT CHANGE UP (ref 150–400)
PLATELET # BLD AUTO: 437 K/UL — HIGH (ref 150–400)
PLATELET # BLD AUTO: 441 K/UL — HIGH (ref 150–400)
PLATELET # BLD AUTO: 451 K/UL
PLATELET # BLD AUTO: 451 K/UL — HIGH (ref 150–400)
POTASSIUM SERPL-MCNC: 3.4 MMOL/L — LOW (ref 3.5–5.3)
POTASSIUM SERPL-MCNC: 3.6 MMOL/L — SIGNIFICANT CHANGE UP (ref 3.5–5.3)
POTASSIUM SERPL-MCNC: 3.7 MMOL/L — SIGNIFICANT CHANGE UP (ref 3.5–5.3)
POTASSIUM SERPL-MCNC: 3.9 MMOL/L — SIGNIFICANT CHANGE UP (ref 3.5–5.3)
POTASSIUM SERPL-MCNC: 4.1 MMOL/L — SIGNIFICANT CHANGE UP (ref 3.5–5.3)
POTASSIUM SERPL-SCNC: 3.4 MMOL/L — LOW (ref 3.5–5.3)
POTASSIUM SERPL-SCNC: 3.6 MMOL/L — SIGNIFICANT CHANGE UP (ref 3.5–5.3)
POTASSIUM SERPL-SCNC: 3.7 MMOL/L
POTASSIUM SERPL-SCNC: 3.7 MMOL/L — SIGNIFICANT CHANGE UP (ref 3.5–5.3)
POTASSIUM SERPL-SCNC: 3.9 MMOL/L — SIGNIFICANT CHANGE UP (ref 3.5–5.3)
POTASSIUM SERPL-SCNC: 4.1 MMOL/L — SIGNIFICANT CHANGE UP (ref 3.5–5.3)
POTASSIUM SERPL-SCNC: 4.3 MMOL/L
PROT FLD-MCNC: 4.2 G/DL — SIGNIFICANT CHANGE UP
PROT SERPL-MCNC: 6.4 G/DL
PROT SERPL-MCNC: 6.7 G/DL — SIGNIFICANT CHANGE UP (ref 6–8.3)
PROT SERPL-MCNC: 6.7 G/DL — SIGNIFICANT CHANGE UP (ref 6–8.3)
PROT SERPL-MCNC: 6.9 G/DL — SIGNIFICANT CHANGE UP (ref 6–8.3)
PROT SERPL-MCNC: 7.2 G/DL — SIGNIFICANT CHANGE UP (ref 6–8.3)
PROTEIN URINE: NORMAL
PROTHROM AB SERPL-ACNC: 17.4 SEC — HIGH (ref 10.6–13.6)
PROTHROM AB SERPL-ACNC: 18.8 SEC — HIGH (ref 10.6–13.6)
RBC # BLD: 4.21 M/UL
RBC # BLD: 4.22 M/UL — SIGNIFICANT CHANGE UP (ref 4.2–5.8)
RBC # BLD: 4.26 M/UL — SIGNIFICANT CHANGE UP (ref 4.2–5.8)
RBC # BLD: 4.39 M/UL — SIGNIFICANT CHANGE UP (ref 4.2–5.8)
RBC # BLD: 4.42 M/UL — SIGNIFICANT CHANGE UP (ref 4.2–5.8)
RBC # FLD: 12.8 % — SIGNIFICANT CHANGE UP (ref 10.3–14.5)
RBC # FLD: 12.9 %
RBC # FLD: 13 % — SIGNIFICANT CHANGE UP (ref 10.3–14.5)
RBC # FLD: 13 % — SIGNIFICANT CHANGE UP (ref 10.3–14.5)
RBC # FLD: 13.2 % — SIGNIFICANT CHANGE UP (ref 10.3–14.5)
RCV VOL RI: 1000 /UL — HIGH (ref 0–0)
SARS-COV-2 IGG SERPL IA-ACNC: <0.1 INDEX
SARS-COV-2 IGG SERPL QL IA: NEGATIVE
SARS-COV-2 N GENE NPH QL NAA+PROBE: NOT DETECTED
SMOOTH MUSCLE AB SER QL IF: NORMAL
SODIUM SERPL-SCNC: 134 MMOL/L — LOW (ref 135–145)
SODIUM SERPL-SCNC: 135 MMOL/L — SIGNIFICANT CHANGE UP (ref 135–145)
SODIUM SERPL-SCNC: 136 MMOL/L — SIGNIFICANT CHANGE UP (ref 135–145)
SODIUM SERPL-SCNC: 137 MMOL/L
SODIUM SERPL-SCNC: 137 MMOL/L — SIGNIFICANT CHANGE UP (ref 135–145)
SODIUM SERPL-SCNC: 140 MMOL/L — SIGNIFICANT CHANGE UP (ref 135–145)
SODIUM SERPL-SCNC: 144 MMOL/L
SPECIFIC GRAVITY URINE: 1.02
SPECIMEN SOURCE FLD: SIGNIFICANT CHANGE UP
SPECIMEN SOURCE: SIGNIFICANT CHANGE UP
TOTAL NUCLEATED CELL COUNT, BODY FLUID: 807 /UL — SIGNIFICANT CHANGE UP
TRIGL FLD-MCNC: 24 MG/DL — SIGNIFICANT CHANGE UP
TROPONIN T SERPL-MCNC: 0.06 NG/ML — CRITICAL HIGH (ref 0–0.01)
TUBE TYPE: SIGNIFICANT CHANGE UP
UROBILINOGEN URINE: NORMAL
VIT B12 SERPL-MCNC: >2000 PG/ML
WBC # BLD: 12.32 K/UL — HIGH (ref 3.8–10.5)
WBC # BLD: 12.62 K/UL — HIGH (ref 3.8–10.5)
WBC # BLD: 13.48 K/UL — HIGH (ref 3.8–10.5)
WBC # BLD: 6.25 K/UL — SIGNIFICANT CHANGE UP (ref 3.8–10.5)
WBC # FLD AUTO: 12.32 K/UL — HIGH (ref 3.8–10.5)
WBC # FLD AUTO: 12.62 K/UL — HIGH (ref 3.8–10.5)
WBC # FLD AUTO: 13.48 K/UL — HIGH (ref 3.8–10.5)
WBC # FLD AUTO: 6.25 K/UL — SIGNIFICANT CHANGE UP (ref 3.8–10.5)
WBC # FLD AUTO: 9.71 K/UL

## 2020-01-01 PROCEDURE — 86705 HEP B CORE ANTIBODY IGM: CPT

## 2020-01-01 PROCEDURE — 84478 ASSAY OF TRIGLYCERIDES: CPT

## 2020-01-01 PROCEDURE — 73502 X-RAY EXAM HIP UNI 2-3 VIEWS: CPT | Mod: 26,LT

## 2020-01-01 PROCEDURE — 99214 OFFICE O/P EST MOD 30 MIN: CPT

## 2020-01-01 PROCEDURE — 99024 POSTOP FOLLOW-UP VISIT: CPT

## 2020-01-01 PROCEDURE — 51798 US URINE CAPACITY MEASURE: CPT

## 2020-01-01 PROCEDURE — 36415 COLL VENOUS BLD VENIPUNCTURE: CPT

## 2020-01-01 PROCEDURE — 99441: CPT

## 2020-01-01 PROCEDURE — 99213 OFFICE O/P EST LOW 20 MIN: CPT

## 2020-01-01 PROCEDURE — 83735 ASSAY OF MAGNESIUM: CPT

## 2020-01-01 PROCEDURE — 83605 ASSAY OF LACTIC ACID: CPT

## 2020-01-01 PROCEDURE — 71260 CT THORAX DX C+: CPT | Mod: 26

## 2020-01-01 PROCEDURE — 92136 OPHTHALMIC BIOMETRY: CPT | Mod: RT

## 2020-01-01 PROCEDURE — 99283 EMERGENCY DEPT VISIT LOW MDM: CPT

## 2020-01-01 PROCEDURE — 99285 EMERGENCY DEPT VISIT HI MDM: CPT

## 2020-01-01 PROCEDURE — 88341 IMHCHEM/IMCYTCHM EA ADD ANTB: CPT | Mod: 26,GW

## 2020-01-01 PROCEDURE — 74177 CT ABD & PELVIS W/CONTRAST: CPT

## 2020-01-01 PROCEDURE — 99497 ADVNCD CARE PLAN 30 MIN: CPT

## 2020-01-01 PROCEDURE — 84100 ASSAY OF PHOSPHORUS: CPT

## 2020-01-01 PROCEDURE — 99233 SBSQ HOSP IP/OBS HIGH 50: CPT

## 2020-01-01 PROCEDURE — 85025 COMPLETE CBC W/AUTO DIFF WBC: CPT

## 2020-01-01 PROCEDURE — 80053 COMPREHEN METABOLIC PANEL: CPT

## 2020-01-01 PROCEDURE — 86709 HEPATITIS A IGM ANTIBODY: CPT

## 2020-01-01 PROCEDURE — 88112 CYTOPATH CELL ENHANCE TECH: CPT

## 2020-01-01 PROCEDURE — 82010 KETONE BODYS QUAN: CPT

## 2020-01-01 PROCEDURE — 99239 HOSP IP/OBS DSCHRG MGMT >30: CPT | Mod: GC,GW

## 2020-01-01 PROCEDURE — 99233 SBSQ HOSP IP/OBS HIGH 50: CPT | Mod: GC,GW

## 2020-01-01 PROCEDURE — 66984 XCAPSL CTRC RMVL W/O ECP: CPT | Mod: LT

## 2020-01-01 PROCEDURE — 87075 CULTR BACTERIA EXCEPT BLOOD: CPT

## 2020-01-01 PROCEDURE — 99204 OFFICE O/P NEW MOD 45 MIN: CPT | Mod: 25

## 2020-01-01 PROCEDURE — 99233 SBSQ HOSP IP/OBS HIGH 50: CPT | Mod: GC

## 2020-01-01 PROCEDURE — 92004 COMPRE OPH EXAM NEW PT 1/>: CPT

## 2020-01-01 PROCEDURE — 71045 X-RAY EXAM CHEST 1 VIEW: CPT | Mod: 26

## 2020-01-01 PROCEDURE — 92202 OPSCPY EXTND ON/MAC DRAW: CPT | Mod: RT

## 2020-01-01 PROCEDURE — 87015 SPECIMEN INFECT AGNT CONCNTJ: CPT

## 2020-01-01 PROCEDURE — 92960 CARDIOVERSION ELECTRIC EXT: CPT

## 2020-01-01 PROCEDURE — 84311 SPECTROPHOTOMETRY: CPT

## 2020-01-01 PROCEDURE — 66984 XCAPSL CTRC RMVL W/O ECP: CPT | Mod: RT,79

## 2020-01-01 PROCEDURE — 76870 US EXAM SCROTUM: CPT

## 2020-01-01 PROCEDURE — ZZZZZ: CPT

## 2020-01-01 PROCEDURE — C1729: CPT

## 2020-01-01 PROCEDURE — 73502 X-RAY EXAM HIP UNI 2-3 VIEWS: CPT

## 2020-01-01 PROCEDURE — 10005 FNA BX W/US GDN 1ST LES: CPT

## 2020-01-01 PROCEDURE — 82042 OTHER SOURCE ALBUMIN QUAN EA: CPT

## 2020-01-01 PROCEDURE — 82570 ASSAY OF URINE CREATININE: CPT

## 2020-01-01 PROCEDURE — 88305 TISSUE EXAM BY PATHOLOGIST: CPT | Mod: 26

## 2020-01-01 PROCEDURE — 83615 LACTATE (LD) (LDH) ENZYME: CPT

## 2020-01-01 PROCEDURE — 83880 ASSAY OF NATRIURETIC PEPTIDE: CPT

## 2020-01-01 PROCEDURE — 92014 COMPRE OPH EXAM EST PT 1/>: CPT

## 2020-01-01 PROCEDURE — 86704 HEP B CORE ANTIBODY TOTAL: CPT

## 2020-01-01 PROCEDURE — 88305 TISSUE EXAM BY PATHOLOGIST: CPT

## 2020-01-01 PROCEDURE — 87205 SMEAR GRAM STAIN: CPT

## 2020-01-01 PROCEDURE — 76511 OPH US DX QUAN A-SCAN ONLY: CPT | Mod: RT

## 2020-01-01 PROCEDURE — 87116 MYCOBACTERIA CULTURE: CPT

## 2020-01-01 PROCEDURE — 93000 ELECTROCARDIOGRAM COMPLETE: CPT

## 2020-01-01 PROCEDURE — 87340 HEPATITIS B SURFACE AG IA: CPT

## 2020-01-01 PROCEDURE — 99497 ADVNCD CARE PLAN 30 MIN: CPT | Mod: 25

## 2020-01-01 PROCEDURE — 99072 ADDL SUPL MATRL&STAF TM PHE: CPT

## 2020-01-01 PROCEDURE — 71260 CT THORAX DX C+: CPT

## 2020-01-01 PROCEDURE — 85730 THROMBOPLASTIN TIME PARTIAL: CPT

## 2020-01-01 PROCEDURE — 99285 EMERGENCY DEPT VISIT HI MDM: CPT | Mod: 25

## 2020-01-01 PROCEDURE — 96374 THER/PROPH/DIAG INJ IV PUSH: CPT

## 2020-01-01 PROCEDURE — 93010 ELECTROCARDIOGRAM REPORT: CPT | Mod: 77

## 2020-01-01 PROCEDURE — 88173 CYTOPATH EVAL FNA REPORT: CPT

## 2020-01-01 PROCEDURE — 88112 CYTOPATH CELL ENHANCE TECH: CPT | Mod: 26

## 2020-01-01 PROCEDURE — 99223 1ST HOSP IP/OBS HIGH 75: CPT | Mod: GC

## 2020-01-01 PROCEDURE — 32557 INSERT CATH PLEURA W/ IMAGE: CPT | Mod: GC

## 2020-01-01 PROCEDURE — 83986 ASSAY PH BODY FLUID NOS: CPT

## 2020-01-01 PROCEDURE — 87206 SMEAR FLUORESCENT/ACID STAI: CPT

## 2020-01-01 PROCEDURE — 89051 BODY FLUID CELL COUNT: CPT

## 2020-01-01 PROCEDURE — 86706 HEP B SURFACE ANTIBODY: CPT

## 2020-01-01 PROCEDURE — 99223 1ST HOSP IP/OBS HIGH 75: CPT

## 2020-01-01 PROCEDURE — 93010 ELECTROCARDIOGRAM REPORT: CPT

## 2020-01-01 PROCEDURE — U0003: CPT

## 2020-01-01 PROCEDURE — 85027 COMPLETE CBC AUTOMATED: CPT

## 2020-01-01 PROCEDURE — 76870 US EXAM SCROTUM: CPT | Mod: 26

## 2020-01-01 PROCEDURE — 86803 HEPATITIS C AB TEST: CPT

## 2020-01-01 PROCEDURE — 88342 IMHCHEM/IMCYTCHM 1ST ANTB: CPT | Mod: 26,59,GW

## 2020-01-01 PROCEDURE — 82945 GLUCOSE OTHER FLUID: CPT

## 2020-01-01 PROCEDURE — 93000 ELECTROCARDIOGRAM COMPLETE: CPT | Mod: NC

## 2020-01-01 PROCEDURE — 84157 ASSAY OF PROTEIN OTHER: CPT

## 2020-01-01 PROCEDURE — 87070 CULTURE OTHR SPECIMN AEROBIC: CPT

## 2020-01-01 PROCEDURE — 85610 PROTHROMBIN TIME: CPT

## 2020-01-01 PROCEDURE — 87102 FUNGUS ISOLATION CULTURE: CPT

## 2020-01-01 PROCEDURE — 80048 BASIC METABOLIC PNL TOTAL CA: CPT

## 2020-01-01 PROCEDURE — 88305 TISSUE EXAM BY PATHOLOGIST: CPT | Mod: 26,GW

## 2020-01-01 PROCEDURE — 99284 EMERGENCY DEPT VISIT MOD MDM: CPT

## 2020-01-01 PROCEDURE — 99233 SBSQ HOSP IP/OBS HIGH 50: CPT | Mod: 25,GC

## 2020-01-01 PROCEDURE — 93005 ELECTROCARDIOGRAM TRACING: CPT

## 2020-01-01 PROCEDURE — 84484 ASSAY OF TROPONIN QUANT: CPT

## 2020-01-01 PROCEDURE — 88173 CYTOPATH EVAL FNA REPORT: CPT | Mod: 26

## 2020-01-01 PROCEDURE — 82105 ALPHA-FETOPROTEIN SERUM: CPT

## 2020-01-01 PROCEDURE — 71045 X-RAY EXAM CHEST 1 VIEW: CPT

## 2020-01-01 PROCEDURE — 74177 CT ABD & PELVIS W/CONTRAST: CPT | Mod: 26

## 2020-01-01 PROCEDURE — 88341 IMHCHEM/IMCYTCHM EA ADD ANTB: CPT

## 2020-01-01 PROCEDURE — 99214 OFFICE O/P EST MOD 30 MIN: CPT | Mod: 25

## 2020-01-01 RX ORDER — FLUTICASONE PROPIONATE 100 UG/1
100 POWDER, METERED RESPIRATORY (INHALATION) TWICE DAILY
Qty: 1 | Refills: 5 | Status: DISCONTINUED | COMMUNITY
Start: 2019-01-25 | End: 2020-01-01

## 2020-01-01 RX ORDER — FUROSEMIDE 40 MG
40 TABLET ORAL DAILY
Refills: 0 | Status: DISCONTINUED | OUTPATIENT
Start: 2020-01-01 | End: 2020-01-01

## 2020-01-01 RX ORDER — POTASSIUM CHLORIDE 10 MEQ
10 CAPSULE, EXTENDED RELEASE ORAL
Refills: 0 | Status: ACTIVE | COMMUNITY

## 2020-01-01 RX ORDER — APIXABAN 2.5 MG/1
1 TABLET, FILM COATED ORAL
Qty: 0 | Refills: 0 | DISCHARGE

## 2020-01-01 RX ORDER — LISINOPRIL 2.5 MG/1
1 TABLET ORAL
Qty: 0 | Refills: 0 | DISCHARGE

## 2020-01-01 RX ORDER — ALBUTEROL 90 MCG
90 AEROSOL (GRAM) INHALATION
Refills: 0 | Status: DISCONTINUED | COMMUNITY
End: 2020-01-01

## 2020-01-01 RX ORDER — SODIUM CHLORIDE 9 MG/ML
1000 INJECTION INTRAMUSCULAR; INTRAVENOUS; SUBCUTANEOUS ONCE
Refills: 0 | Status: COMPLETED | OUTPATIENT
Start: 2020-01-01 | End: 2020-01-01

## 2020-01-01 RX ORDER — BENZONATATE 200 MG/1
200 CAPSULE ORAL
Refills: 0 | Status: DISCONTINUED | COMMUNITY
End: 2020-01-01

## 2020-01-01 RX ORDER — APIXABAN 5 MG/1
5 TABLET, FILM COATED ORAL
Qty: 180 | Refills: 0 | Status: ACTIVE | COMMUNITY
Start: 2018-11-19

## 2020-01-01 RX ORDER — GEMFIBROZIL 600 MG
600 TABLET ORAL
Refills: 0 | Status: DISCONTINUED | OUTPATIENT
Start: 2020-01-01 | End: 2020-01-01

## 2020-01-01 RX ORDER — MIRTAZAPINE 45 MG/1
7.5 TABLET, ORALLY DISINTEGRATING ORAL EVERY 24 HOURS
Refills: 0 | Status: DISCONTINUED | OUTPATIENT
Start: 2020-01-01 | End: 2020-01-01

## 2020-01-01 RX ORDER — POTASSIUM CHLORIDE 20 MEQ
40 PACKET (EA) ORAL EVERY 4 HOURS
Refills: 0 | Status: COMPLETED | OUTPATIENT
Start: 2020-01-01 | End: 2020-01-01

## 2020-01-01 RX ORDER — SIMVASTATIN 20 MG/1
1 TABLET, FILM COATED ORAL
Qty: 0 | Refills: 0 | DISCHARGE

## 2020-01-01 RX ORDER — DRONABINOL 2.5 MG/1
2.5 CAPSULE ORAL TWICE DAILY
Qty: 60 | Refills: 5 | Status: ACTIVE | COMMUNITY
Start: 2020-01-01 | End: 1900-01-01

## 2020-01-01 RX ORDER — AMLODIPINE BESYLATE 2.5 MG/1
5 TABLET ORAL DAILY
Refills: 0 | Status: DISCONTINUED | OUTPATIENT
Start: 2020-01-01 | End: 2020-01-01

## 2020-01-01 RX ORDER — TAMSULOSIN HYDROCHLORIDE 0.4 MG/1
0.4 CAPSULE ORAL
Qty: 30 | Refills: 11 | Status: ACTIVE | COMMUNITY
Start: 2020-01-01 | End: 1900-01-01

## 2020-01-01 RX ORDER — APIXABAN 2.5 MG/1
5 TABLET, FILM COATED ORAL EVERY 12 HOURS
Refills: 0 | Status: DISCONTINUED | OUTPATIENT
Start: 2020-01-01 | End: 2020-01-01

## 2020-01-01 RX ORDER — GEMFIBROZIL 600 MG
1 TABLET ORAL
Qty: 0 | Refills: 0 | DISCHARGE

## 2020-01-01 RX ORDER — POLYETHYLENE GLYCOL 3350 17 G/17G
17 POWDER, FOR SOLUTION ORAL DAILY
Refills: 0 | Status: DISCONTINUED | OUTPATIENT
Start: 2020-01-01 | End: 2020-01-01

## 2020-01-01 RX ORDER — COLCHICINE 0.6 MG
1 TABLET ORAL
Qty: 0 | Refills: 0 | DISCHARGE

## 2020-01-01 RX ORDER — ENOXAPARIN SODIUM 100 MG/ML
40 INJECTION SUBCUTANEOUS EVERY 24 HOURS
Refills: 0 | Status: DISCONTINUED | OUTPATIENT
Start: 2020-01-01 | End: 2020-01-01

## 2020-01-01 RX ORDER — IOHEXOL 300 MG/ML
30 INJECTION, SOLUTION INTRAVENOUS ONCE
Refills: 0 | Status: COMPLETED | OUTPATIENT
Start: 2020-01-01 | End: 2020-01-01

## 2020-01-01 RX ORDER — FUROSEMIDE 40 MG
20 TABLET ORAL ONCE
Refills: 0 | Status: COMPLETED | OUTPATIENT
Start: 2020-01-01 | End: 2020-01-01

## 2020-01-01 RX ORDER — NYSTATIN 100MM UNIT
POWDER (EA) MISCELLANEOUS
Qty: 1000 | Refills: 0 | Status: ACTIVE | COMMUNITY
Start: 2020-01-01 | End: 1900-01-01

## 2020-01-01 RX ORDER — PROMETHAZINE HYDROCHLORIDE 25 MG/1
SUPPOSITORY RECTAL
Refills: 0 | Status: DISCONTINUED | COMMUNITY
End: 2020-01-01

## 2020-01-01 RX ORDER — FUROSEMIDE 40 MG
1 TABLET ORAL
Qty: 0 | Refills: 0 | DISCHARGE

## 2020-01-01 RX ORDER — AMLODIPINE BESYLATE 2.5 MG/1
1 TABLET ORAL
Qty: 0 | Refills: 0 | DISCHARGE

## 2020-01-01 RX ORDER — TAMSULOSIN HYDROCHLORIDE 0.4 MG/1
1 CAPSULE ORAL
Qty: 0 | Refills: 0 | DISCHARGE

## 2020-01-01 RX ORDER — DRONABINOL 2.5 MG
2.5 CAPSULE ORAL EVERY 12 HOURS
Refills: 0 | Status: DISCONTINUED | OUTPATIENT
Start: 2020-01-01 | End: 2020-01-01

## 2020-01-01 RX ORDER — MORPHINE SULFATE 50 MG/1
1.25 CAPSULE, EXTENDED RELEASE ORAL
Qty: 22.5 | Refills: 0
Start: 2020-01-01 | End: 2020-01-01

## 2020-01-01 RX ORDER — MIRTAZAPINE 45 MG/1
1 TABLET, ORALLY DISINTEGRATING ORAL
Qty: 30 | Refills: 2
Start: 2020-01-01 | End: 2021-02-02

## 2020-01-01 RX ORDER — SIMVASTATIN 20 MG/1
40 TABLET, FILM COATED ORAL AT BEDTIME
Refills: 0 | Status: DISCONTINUED | OUTPATIENT
Start: 2020-01-01 | End: 2020-01-01

## 2020-01-01 RX ORDER — DRONABINOL 2.5 MG
1 CAPSULE ORAL
Qty: 60 | Refills: 1
Start: 2020-01-01 | End: 2021-01-03

## 2020-01-01 RX ORDER — POTASSIUM CHLORIDE 750 MG/1
10 CAPSULE, EXTENDED RELEASE ORAL
Qty: 30 | Refills: 5 | Status: ACTIVE | COMMUNITY
Start: 2020-01-01 | End: 1900-01-01

## 2020-01-01 RX ORDER — DRONABINOL 2.5 MG
1 CAPSULE ORAL
Qty: 60 | Refills: 2
Start: 2020-01-01 | End: 2021-02-02

## 2020-01-01 RX ORDER — DEXAMETHASONE 0.5 MG/5ML
1 ELIXIR ORAL
Qty: 60 | Refills: 1
Start: 2020-01-01 | End: 2021-01-03

## 2020-01-01 RX ORDER — LISINOPRIL 2.5 MG/1
40 TABLET ORAL DAILY
Refills: 0 | Status: DISCONTINUED | OUTPATIENT
Start: 2020-01-01 | End: 2020-01-01

## 2020-01-01 RX ORDER — SENNA PLUS 8.6 MG/1
2 TABLET ORAL AT BEDTIME
Refills: 0 | Status: DISCONTINUED | OUTPATIENT
Start: 2020-01-01 | End: 2020-01-01

## 2020-01-01 RX ORDER — FUROSEMIDE 40 MG/1
40 TABLET ORAL
Refills: 0 | Status: ACTIVE | COMMUNITY

## 2020-01-01 RX ADMIN — AMLODIPINE BESYLATE 5 MILLIGRAM(S): 2.5 TABLET ORAL at 05:34

## 2020-01-01 RX ADMIN — SENNA PLUS 2 TABLET(S): 8.6 TABLET ORAL at 21:35

## 2020-01-01 RX ADMIN — AMLODIPINE BESYLATE 5 MILLIGRAM(S): 2.5 TABLET ORAL at 06:14

## 2020-01-01 RX ADMIN — AMLODIPINE BESYLATE 5 MILLIGRAM(S): 2.5 TABLET ORAL at 06:57

## 2020-01-01 RX ADMIN — AMLODIPINE BESYLATE 5 MILLIGRAM(S): 2.5 TABLET ORAL at 05:47

## 2020-01-01 RX ADMIN — LISINOPRIL 40 MILLIGRAM(S): 2.5 TABLET ORAL at 05:34

## 2020-01-01 RX ADMIN — APIXABAN 5 MILLIGRAM(S): 2.5 TABLET, FILM COATED ORAL at 05:47

## 2020-01-01 RX ADMIN — Medication 40 MILLIEQUIVALENT(S): at 14:54

## 2020-01-01 RX ADMIN — POLYETHYLENE GLYCOL 3350 17 GRAM(S): 17 POWDER, FOR SOLUTION ORAL at 12:11

## 2020-01-01 RX ADMIN — SIMVASTATIN 40 MILLIGRAM(S): 20 TABLET, FILM COATED ORAL at 21:35

## 2020-01-01 RX ADMIN — SIMVASTATIN 40 MILLIGRAM(S): 20 TABLET, FILM COATED ORAL at 02:09

## 2020-01-01 RX ADMIN — SIMVASTATIN 40 MILLIGRAM(S): 20 TABLET, FILM COATED ORAL at 21:22

## 2020-01-01 RX ADMIN — Medication 40 MILLIGRAM(S): at 18:27

## 2020-01-01 RX ADMIN — Medication 600 MILLIGRAM(S): at 06:14

## 2020-01-01 RX ADMIN — LISINOPRIL 40 MILLIGRAM(S): 2.5 TABLET ORAL at 05:47

## 2020-01-01 RX ADMIN — Medication 600 MILLIGRAM(S): at 18:26

## 2020-01-01 RX ADMIN — Medication 600 MILLIGRAM(S): at 05:47

## 2020-01-01 RX ADMIN — Medication 2.5 MILLIGRAM(S): at 05:34

## 2020-01-01 RX ADMIN — APIXABAN 5 MILLIGRAM(S): 2.5 TABLET, FILM COATED ORAL at 23:29

## 2020-01-01 RX ADMIN — Medication 600 MILLIGRAM(S): at 18:04

## 2020-01-01 RX ADMIN — SENNA PLUS 2 TABLET(S): 8.6 TABLET ORAL at 02:09

## 2020-01-01 RX ADMIN — Medication 600 MILLIGRAM(S): at 05:34

## 2020-01-01 RX ADMIN — MIRTAZAPINE 7.5 MILLIGRAM(S): 45 TABLET, ORALLY DISINTEGRATING ORAL at 13:33

## 2020-01-01 RX ADMIN — IOHEXOL 30 MILLILITER(S): 300 INJECTION, SOLUTION INTRAVENOUS at 16:32

## 2020-01-01 RX ADMIN — LISINOPRIL 40 MILLIGRAM(S): 2.5 TABLET ORAL at 06:14

## 2020-01-01 RX ADMIN — MIRTAZAPINE 7.5 MILLIGRAM(S): 45 TABLET, ORALLY DISINTEGRATING ORAL at 12:11

## 2020-01-01 RX ADMIN — ENOXAPARIN SODIUM 40 MILLIGRAM(S): 100 INJECTION SUBCUTANEOUS at 21:22

## 2020-01-01 RX ADMIN — SODIUM CHLORIDE 1000 MILLILITER(S): 9 INJECTION INTRAMUSCULAR; INTRAVENOUS; SUBCUTANEOUS at 14:18

## 2020-01-01 RX ADMIN — APIXABAN 5 MILLIGRAM(S): 2.5 TABLET, FILM COATED ORAL at 02:09

## 2020-01-01 RX ADMIN — Medication 600 MILLIGRAM(S): at 18:27

## 2020-01-01 RX ADMIN — Medication 2.5 MILLIGRAM(S): at 06:17

## 2020-01-01 RX ADMIN — Medication 40 MILLIGRAM(S): at 05:34

## 2020-01-01 RX ADMIN — Medication 40 MILLIEQUIVALENT(S): at 10:59

## 2020-01-01 RX ADMIN — POLYETHYLENE GLYCOL 3350 17 GRAM(S): 17 POWDER, FOR SOLUTION ORAL at 12:18

## 2020-01-01 RX ADMIN — POLYETHYLENE GLYCOL 3350 17 GRAM(S): 17 POWDER, FOR SOLUTION ORAL at 11:14

## 2020-01-01 RX ADMIN — LISINOPRIL 40 MILLIGRAM(S): 2.5 TABLET ORAL at 06:57

## 2020-01-01 RX ADMIN — Medication 20 MILLIGRAM(S): at 18:12

## 2020-01-01 RX ADMIN — Medication 2.5 MILLIGRAM(S): at 18:27

## 2020-01-01 RX ADMIN — Medication 2.5 MILLIGRAM(S): at 18:04

## 2020-01-01 RX ADMIN — SENNA PLUS 2 TABLET(S): 8.6 TABLET ORAL at 21:22

## 2020-01-01 RX ADMIN — Medication 600 MILLIGRAM(S): at 06:17

## 2020-01-01 RX ADMIN — Medication 40 MILLIGRAM(S): at 05:47

## 2020-03-03 PROBLEM — I42.9 CARDIOMYOPATHY: Status: ACTIVE | Noted: 2020-01-01

## 2020-03-03 NOTE — REVIEW OF SYSTEMS
[Negative] : Heme/Lymph [Fever] : no fever [Headache] : no headache [Chills] : no chills [Recent Weight Gain (___ Lbs)] : no recent weight gain [Feeling Fatigued] : feeling fatigued [Recent Weight Loss (___ Lbs)] : no recent weight loss [Memory Lapses Or Loss] : memory lapses or loss

## 2020-03-03 NOTE — HISTORY OF PRESENT ILLNESS
[FreeTextEntry1] : This is an 85 yo male with a history of throat cancer (S/P radiation therapy) HTN, dyslipidemia, PVCs, LBBB who was diagnosed with Afib in 2015.  He had experienced fatigue.  He underwent successful DCCV 5/4/15.  Previously maintained on Amiodarone- unclear when and why this was stopped.  Last seen in office in 2016. He is referred back by Dr. Forbes for management of atrial fibrillation and cardiomyopathy.  He is pending cataract surgery in the coming weeks/months.  Records reflect Metoprolol was discontinued in February 2/2 bradycardia.  He denies awareness of rapid/irregular heart action.  Exertional tolerance is limited but unchanged recently.  No presyncope/syncope.\par \par ZioXT 2/3 - 2/17/20 Persistent atrial fibrillation, (63-)\par ECHO 7/2019 EF 30%, global LV hypokinesis, Dilated RV & LV, LA 4.5 cm

## 2020-03-03 NOTE — DISCUSSION/SUMMARY
[FreeTextEntry1] : 81 yo man with Afib, S/P DCCV in 2015.  No evidence for recurrent arrhythmia by symptoms or ECG today on Amiodarone.  Mild bradycardia.\par \par Continue current meds.  24 hour Holter monitor upon his return to [facility] to evaluate heart rates and rhythm.  Will be done either with Dr. Forbes or here.  Return here for re-assessment in 4 months and call with any questions or concerns.  \par \par We reviewed the importance of regular eye exams and sunscreen use while on Amiodarone.  We also discussed routine LFT and TFT screening while on Amiodarone.

## 2020-03-03 NOTE — PHYSICAL EXAM
[General Appearance - Well Developed] : well developed [Well Groomed] : well groomed [Normal Appearance] : normal appearance [No Deformities] : no deformities [General Appearance - Well Nourished] : well nourished [Normal Conjunctiva] : the conjunctiva exhibited no abnormalities [General Appearance - In No Acute Distress] : no acute distress [No Oral Pallor] : no oral pallor [Respiration, Rhythm And Depth] : normal respiratory rhythm and effort [Bowel Sounds] : normal bowel sounds [Auscultation Breath Sounds / Voice Sounds] : lungs were clear to auscultation bilaterally [Exaggerated Use Of Accessory Muscles For Inspiration] : no accessory muscle use [Abdomen Soft] : soft [Abdomen Tenderness] : non-tender [Gait - Sufficient For Exercise Testing] : the gait was sufficient for exercise testing [Nail Clubbing] : no clubbing of the fingernails [Abnormal Walk] : normal gait [Cyanosis, Localized] : no localized cyanosis [Skin Color & Pigmentation] : normal skin color and pigmentation [] : no rash [Skin Turgor] : normal skin turgor [No Venous Stasis] : no venous stasis [Skin Lesions] : no skin lesions [No Skin Ulcers] : no skin ulcer [Oriented To Time, Place, And Person] : oriented to person, place, and time [No Xanthoma] : no  xanthoma was observed [Affect] : the affect was normal [Mood] : the mood was normal [Impaired Insight] : insight and judgment were intact [5th Left ICS - MCL] : palpated at the 5th LICS in the midclavicular line [No Anxiety] : not feeling anxious [No Precordial Heave] : no precordial heave was noted [Normal] : normal [Normal S2] : normal S2 [Normal S1] : normal S1 [No Gallop] : no gallop heard [No Murmur] : no murmurs heard [2+] : left 2+ [No Pitting Edema] : no pitting edema present [FreeTextEntry1] : no JVD [Normal Rate] : normal [Irregularly Irregular] : irregularly irregular [S3] : no S3 [Click] : no click [S4] : no S4 [Pericardial Rub] : no pericardial rub [Left Carotid Bruit] : no bruit heard over the left carotid [Right Carotid Bruit] : no bruit heard over the right carotid

## 2020-03-25 PROBLEM — Z01.818 PRE-OP EVALUATION: Status: ACTIVE | Noted: 2020-01-01

## 2020-03-25 NOTE — HISTORY OF PRESENT ILLNESS
[FreeTextEntry1] : cataract- left [FreeTextEntry2] : 4/20/2020 [FreeTextEntry3] : Dr Velázquez [FreeTextEntry4] : 87 yo M with HTN, HLD, laryngeal CA, Afib, CHF with EF 30%\par Poor vision - left cataract

## 2020-03-25 NOTE — PHYSICAL EXAM
[No Edema] : there was no peripheral edema [Normal] : no rash [de-identified] : irregularly irregular [de-identified] : memory issues

## 2020-05-30 PROBLEM — Z01.818 PRE-OP TESTING: Status: ACTIVE | Noted: 2020-01-01

## 2020-09-09 NOTE — ED PROVIDER NOTE - PATIENT PORTAL LINK FT
You can access the FollowMyHealth Patient Portal offered by [facility] by registering at the following website: [email or web address]. By joining [email or web address]’s FollowMyHealth portal, you will also be able to view your health information using other applications (apps) compatible with our system.

## 2020-09-09 NOTE — ED PROVIDER NOTE - GENITOURINARY, MLM
No discharge, lesions. Diffuse erythema to testicles and inner part of thighs, no testicular tenderness, patient states testicles do not appear more enlarged than usual

## 2020-09-09 NOTE — ED PROVIDER NOTE - OBJECTIVE STATEMENT
86y M presents to the ED with complains of rash to the groin for 3x days, no fever, no chills placed in corn starch. Denies testicular pain, suprapubic pain, nausea, or vomiting. Patient is still urinating.

## 2020-09-09 NOTE — ED PROVIDER NOTE - NSFOLLOWUPINSTRUCTIONS_ED_ALL_ED_FT
PLEASE RETURN FOR WORSENING PAIN, FEVER, CHILLS OR OTHER CONCERNS.    Jock Itch    WHAT YOU NEED TO KNOW:    What is jock itch and what causes it? Jock itch is a rash on your groin. The groin is the area between your abdomen and legs. Jock itch is usually easy to treat and prevent. It is caused by a fungus. The fungus also causes athlete's foot.    What increases my risk of jock itch?     Contact: The most common cause of jock itch is contact with something that has the fungus. For example, you touch another person's skin or clothes when you play contact sports. Jock itch is also easily spread among people who live close together, such as in a college dorm. You can also spread the fungus to your groin from your feet if you have athlete's foot.       Moisture: The fungus that causes jock itch multiplies quickly in warm, moist areas. The fungus can grow in the sweat collected in the folds of your skin. You can get jock itch when your clothes are wet or too tight. For example, you wear tight pants or leave a wet bathing suit on. You can get jock itch if you are in a warm and humid climate.      Medical conditions: You are at a higher risk of jock itch if you are overweight. It may be hard to prevent jock itch if you have a weak immune system. Diabetes (high blood sugar) can also put you at risk of jock itch.      Gender: You are more likely to get jock itch if you are male.    What are the signs and symptoms of jock itch? Jock itch is a reddish-brown rash with round lesions that can spread from your groin to your thighs and buttocks. You may see a red ring with raised edges. You may see flakes of skin on the rash. The rash may burn, itch, or be painful.    How is jock itch diagnosed? Your healthcare provider will ask about your signs and symptoms and examine you. He may ask if you have any medical conditions, such as diabetes. Your healthcare provider may ask if you play sports. He may ask if you wear tight clothes or leave wet clothes on for long periods. He will check your groin and your feet for a rash. Your healthcare provider may gently scrape off some of your skin with a special tool. An exam of the skin rash can help your healthcare provider diagnose jock itch.     How is jock itch treated? Jock itch is usually treated with a cream that kills the fungus. Apply the cream to the rash and the skin around it as directed. You may need to apply the cream 1 to 2 times each day for 2 weeks. You may be given this medicine as a pill if the cream does not help.    What are the risks of jock itch? You may get a headache or rash somewhere else on your body from the medicine used to treat jock itch . The medicines may cause stomach pain, nausea, vomiting, or diarrhea. Without treatment, your jock itch could become severe. You might have to take more than one kind of medicine to treat a severe rash. You may get jock itch again, even after treatment.     What can I do to manage and prevent jock itch?     Keep the area dry.      Wear light, loose clothes. Do not share clothes.      Do not wear wet clothes for long periods. Wash athletic gear after you play sports.      Bathe daily. Dry your skin completely after you bathe. Apply cream or powder after you bathe as directed if you get jock itch often. Wash your hands often to prevent the spread of the fungus. You may want to wear disposable gloves when you clean your feet. The gloves will keep the fungus from moving from your feet to your hands.      Use separate towels to dry each part of your body. Put your socks on before you put on your underwear so you do not spread the fungus from your feet to your groin.      Lose weight if you are overweight.     When should I contact my healthcare provider?     Your signs and symptoms do not get better within 2 weeks of treatment.      Your signs and symptoms get worse or come back after treatment.      You get a rash on a part of your body other than your groin.      You have a fever.      You have questions or concerns about your condition or care.    CARE AGREEMENT:    You have the right to help plan your care. Learn about your health condition and how it may be treated. Discuss treatment options with your healthcare providers to decide what care you want to receive. You always have the right to refuse treatment.        © Copyright [employer] 2020       back to top                      © Copyright [employer] 2020

## 2020-09-09 NOTE — ED PROVIDER NOTE - CLINICAL SUMMARY MEDICAL DECISION MAKING FREE TEXT BOX
86y M with groin rash, appearance looks like fungal infection, will prescribe anointment and discharge. 86y M with groin rash, appearance looks like fungal infection, will prescribe ointment and discharge. no crepitus, no wbc elevation, lactate wnl low suspicion for deeper fascitis, however, strict return prec given.

## 2020-09-18 PROBLEM — R10.2 PELVIC PAIN: Status: ACTIVE | Noted: 2020-01-01

## 2020-09-18 NOTE — REVIEW OF SYSTEMS
[Abdominal Pain] : abdominal pain [Frequency] : frequency [Negative] : Constitutional [Nausea] : no nausea [Diarrhea] : no diarrhea [Vomiting] : no vomiting

## 2020-09-18 NOTE — HISTORY OF PRESENT ILLNESS
[FreeTextEntry1] : groin pain [de-identified] : reports intermittent pain in left groin for past month.  Worse with walking and going up stairs. Has taken tylenol which has helped.\par Left testicle does bother him as well.

## 2020-09-18 NOTE — PHYSICAL EXAM
[Normal] : no posterior cervical lymphadenopathy and no anterior cervical lymphadenopathy [de-identified] : testic swelling [de-identified] : dec rom left hip [de-identified] : erythema b/l groins

## 2020-09-18 NOTE — PLAN
[FreeTextEntry1] : Hip pain / abd pain - \par send for testic us and xray\par \par f/up Dr Hernandez

## 2020-10-01 PROBLEM — M54.9 ACUTE BACK PAIN: Status: ACTIVE | Noted: 2020-01-01

## 2020-10-01 PROBLEM — N40.1 BPH ASSOCIATED WITH NOCTURIA: Status: ACTIVE | Noted: 2020-01-01

## 2020-10-01 NOTE — HISTORY OF PRESENT ILLNESS
[FreeTextEntry1] : 86M CAD Afib on eliquis referred with right hydrocele on sonogram. reports pain from left groin down into left foot. no pain on right side. pain is primarily left sided. increasing urinary freqeuncy. nocturia x 2 -3.  decreased FOS. no heamturia. no fevers hcills. no nasuea vomting. no fmaily history prostate kdiney bladder cancer. no addtionalc ompaltins. \par failed hydrocelectomy with Fracchia in the past

## 2020-10-01 NOTE — ASSESSMENT
[FreeTextEntry1] : right hydrocele\par multiple comorbidities \par will monitor\par \par BPH\par trail flomax \par PVR 38\par f/u 1 month

## 2020-10-09 PROBLEM — R21 GROIN RASH: Status: ACTIVE | Noted: 2020-01-01

## 2020-10-12 NOTE — ASSESSMENT
[FreeTextEntry1] : Scrotal weeping vs urinary incontinence\par \par - Continue Tamsulosin\par - Elevate scrotum at night \par - Nystatin powder to groin\par - F/u few weeks to reassess voiding symptoms. consider increaswing tamsulosin dosing\par \par \par

## 2020-10-12 NOTE — PHYSICAL EXAM
[General Appearance - Well Developed] : well developed [General Appearance - Well Nourished] : well nourished [Normal Appearance] : normal appearance [Well Groomed] : well groomed [General Appearance - In No Acute Distress] : no acute distress [Abdomen Soft] : soft [Abdomen Tenderness] : non-tender [Costovertebral Angle Tenderness] : no ~M costovertebral angle tenderness [Urethral Meatus] : meatus normal [Penis Abnormality] : normal uncircumcised penis [Urinary Bladder Findings] : the bladder was normal on palpation [No Prostate Nodules] : no prostate nodules [Heart Rate And Rhythm] : Heart rate and rhythm were normal [] : no respiratory distress [Oriented To Time, Place, And Person] : oriented to person, place, and time [No Focal Deficits] : no focal deficits [FreeTextEntry1] : Jay groin rash. moist skin.

## 2020-10-12 NOTE — HISTORY OF PRESENT ILLNESS
[FreeTextEntry1] : 86 M with multiple comorbidities here today for scrotal weeping.\par Noticed a saturated pad of serous fluid this morning vs urine\par Right hydrocele with left groin pain and left leg pain\par Notes mild improvement of urinary frequency since starting tamsulosin earlier this week\par   [Straining] : no straining [Dysuria] : no dysuria [Hematuria - Gross] : no gross hematuria [Bladder Spasm] : no bladder spasm [Abdominal Pain] : no abdominal pain [Flank Pain] : no flank pain [Weight Loss] : no recent ~M [unfilled] weight loss [Fever] : no fever [Fatigue] : no fatigue [Nausea] : no nausea [Anorexia] : no anorexia

## 2020-10-29 PROBLEM — R63.4 ABNORMAL LOSS OF WEIGHT: Status: ACTIVE | Noted: 2020-01-01

## 2020-10-29 PROBLEM — R74.8 ELEVATED ALKALINE PHOSPHATASE LEVEL: Status: ACTIVE | Noted: 2020-01-01

## 2020-10-29 NOTE — PLAN
[FreeTextEntry1] : \par \par Fatigue, weight loss, abd pain - send for ct c/a/p - malignancy workup.  \par \par MRI left hip \par \par Elev alk phos- check ama, pbc, ggt

## 2020-10-29 NOTE — HISTORY OF PRESENT ILLNESS
[de-identified] : \par Having groin pain still down to the knee. \par Feeling weak.  Poor appetite.  No dizziness.  Having ensure twice a day.  \par more tired than usual.  Not sleeping well.

## 2020-10-29 NOTE — PHYSICAL EXAM
[No Edema] : there was no peripheral edema [Normal] : no posterior cervical lymphadenopathy and no anterior cervical lymphadenopathy [de-identified] : testic swelling [de-identified] : dec rom left hip [de-identified] : erythema b/l groins

## 2020-10-29 NOTE — REVIEW OF SYSTEMS
[Fatigue] : fatigue [Recent Change In Weight] : ~T recent weight change [Dyspnea on Exertion] : dyspnea on exertion [Abdominal Pain] : abdominal pain [Nausea] : no nausea [Diarrhea] : no diarrhea [Vomiting] : no vomiting [Frequency] : frequency [Joint Pain] : joint pain [Negative] : Psychiatric

## 2020-11-02 NOTE — H&P ADULT - NSHPPHYSICALEXAM_GEN_ALL_CORE
VITAL SIGNS:  T(C): 36.6 (11-02-20 @ 13:34), Max: 36.6 (11-02-20 @ 13:34)  T(F): 97.8 (11-02-20 @ 13:34), Max: 97.8 (11-02-20 @ 13:34)  HR: 93 (11-02-20 @ 20:42) (93 - 102)  BP: 145/80 (11-02-20 @ 20:42) (137/76 - 145/80)  BP(mean): --  RR: 20 (11-02-20 @ 20:43) (20 - 20)  SpO2: 94% (11-02-20 @ 20:43) (86% - 95%)  Wt(kg): --    PHYSICAL EXAM:    Constitutional: WDWN resting comfortably in bed; NAD  Head: NC/AT  Eyes: PERRL, EOMI, anicteric sclera  ENT: no nasal discharge; uvula midline, no oropharyngeal erythema or exudates; MMM  Neck: supple; no JVD or thyromegaly  Respiratory: CTA B/L; no W/R/R, no retractions  Cardiac: +S1/S2; RRR; no M/R/G  Gastrointestinal: abdomen soft, NT/ND; no rebound or guarding; +BSx4  Back: spine midline, no bony tenderness or step-offs  Extremities: WWP, no clubbing or cyanosis; no peripheral edema  Musculoskeletal: NROM x4; no joint swelling, tenderness or erythema  Vascular: 2+ radial, DP/PT pulses B/L  Dermatologic: skin warm, dry and intact; no rashes, wounds, or scars  Lymphatic: no submandibular or cervical LAD  Neurologic: AAOx3; CNII-XII grossly intact; no focal deficits  Psychiatric: affect and characteristics of appearance, verbalizations, behaviors are appropriate VITAL SIGNS:  T(C): 36.6 (11-02-20 @ 13:34), Max: 36.6 (11-02-20 @ 13:34)  T(F): 97.8 (11-02-20 @ 13:34), Max: 97.8 (11-02-20 @ 13:34)  HR: 93 (11-02-20 @ 20:42) (93 - 102)  BP: 145/80 (11-02-20 @ 20:42) (137/76 - 145/80)  BP(mean): --  RR: 20 (11-02-20 @ 20:43) (20 - 20)  SpO2: 94% (11-02-20 @ 20:43) (86% - 95%)  Wt(kg): --    PHYSICAL EXAM:    Constitutional: elderly male, resting comfortably in bed; NAD  Head: NC/AT  Eyes: PERRL, EOMI, anicteric sclera  ENT: no nasal discharge; uvula midline, no oropharyngeal erythema or exudates; MMM  Neck: supple; no JVD  Respiratory: decreased breath sounds of right mid lung. No W/R/R, no retractions. no respiratory distress.  Cardiac: +S1/S2; RRR; no M/R/G  Gastrointestinal: abdomen soft, NT/ND; no rebound or guarding; +BSx4  Back: spine midline, no bony tenderness or step-offs  Extremities: WWP, no clubbing or cyanosis; no peripheral edema  Musculoskeletal: NROM x4; no joint swelling, tenderness or erythema  Vascular: 2+ radial, DP/PT pulses B/L  Dermatologic: skin warm, dry and intact; no rashes, wounds, or scars  Lymphatic: no submandibular or cervical LAD  Neurologic: AAOx3; CNII-XII grossly intact; no focal deficits  Psychiatric: affect and characteristics of appearance, verbalizations, behaviors are appropriate VITAL SIGNS:  T(C): 36.6 (11-02-20 @ 13:34), Max: 36.6 (11-02-20 @ 13:34)  T(F): 97.8 (11-02-20 @ 13:34), Max: 97.8 (11-02-20 @ 13:34)  HR: 93 (11-02-20 @ 20:42) (93 - 102)  BP: 145/80 (11-02-20 @ 20:42) (137/76 - 145/80)  BP(mean): --  RR: 20 (11-02-20 @ 20:43) (20 - 20)  SpO2: 94% (11-02-20 @ 20:43) (86% - 95%)  Wt(kg): --    PHYSICAL EXAM:    Constitutional: elderly male, resting comfortably in bed; NAD  Head: NC/AT  Eyes: PERRL, EOMI, anicteric sclera  ENT: no nasal discharge; uvula midline, no oropharyngeal erythema or exudates; MMM  Neck: supple; no JVD  Respiratory: decreased breath sounds up to right mid lung. No W/R/R, no retractions. no respiratory distress.  Cardiac: +S1/S2; RRR; no M/R/G  Gastrointestinal: abdomen soft, NT, +distension; no rebound or guarding; +BSx4  Extremities: WWP, no clubbing or cyanosis; no peripheral edema  Musculoskeletal: NROM x4; no joint swelling, tenderness or erythema  Vascular: 2+ radial, DP/PT pulses B/L  Dermatologic: skin warm, dry and intact; no rashes, wounds, or scars  Lymphatic: no submandibular or cervical LAD  Neurologic: AAOx3; CNII-XII grossly intact; no focal deficits  Psychiatric: affect and characteristics of appearance, verbalizations, behaviors are appropriate

## 2020-11-02 NOTE — ED PROVIDER NOTE - CARE PLAN
Principal Discharge DX:	Weight loss   Principal Discharge DX:	Weight loss  Secondary Diagnosis:	Malignancy  Secondary Diagnosis:	Pleural effusion

## 2020-11-02 NOTE — H&P ADULT - HISTORY OF PRESENT ILLNESS
Pt is an 86M, poor historian, former long time smoker, with a history of throat cancer (S/P radiation therapy) HTN, dyslipidemia, PVCs, LBBB, afib, CM Pt is an 86M former long time smoker, with a history of throat cancer (S/P radiation therapy) 7 years ago, HTN, dyslipidemia, afib on eliquis, HFrEF (3/2020 echo LVEF 30% w/ global hypokinesis) presenting with 1 month of 10lbs weight loss, generalized weakness, poor appetite and decreased PO intake, intermittent upper abd pain. Pt also endorsing a cough for past few days. Pt denies pain when eating or early satiety. Pt states he has been drinking ensure. Per daughter and chart review, pt has been following with Dr. Daily as there for malignancy and pt was sent to ED for work up. Pt denies any fever, chills, n/v/d, chest pain, SOB or dysuria. Of note, pt states he stopped taking his tamsulosin 2 days ago due to urinary frequency on lasix.    In ED, vitals: T 97.8, , /76, RR 20, O2 95% on RA. Labs significant for wbc 10.77, INR 1.81, Na 133, , trop 0.06, BNP 5638. EKG: Afib with LBBB, PVC, prolonged QTc.  CT abd/pelvis and chest:  1. New large right-sided pleural effusion with passive atelectasis of the right lower lobe. The central airways are patent.  2. Probable hematogenous metastatic pattern to the lungs with multiple nodules as well as a large right upper lobe mass which may represent a primary lung and/or synchronous lung carcinoma. There may be lymphangitic carcinomatosis involving the right upper right middle and right lower lobes.  3. Cardiomegaly and mild superimposed pulmonary venous congestion cannot be excluded.  4. Multiple intrahepatic low-attenuation lesions throughout represent metastatic disease and/or primary multifocal hepatocellular neoplasm in view the presence of a nodular hepatic contour which may suggest cirrhosis.  5. Severe diverticulosis coli of the sigmoid colon. Increased colonic stool burden compatible with obstipation.  6. Cholelithiasis.  7. Gastroparesis.  8. Enlarged prostate gland with mild bladder wall thickening to suggest a component of bladder outlet obstruction.  7. Marked diffuse calcified and noncalcified atheromatous plaque with narrowing of the origin of the right renal arteries as well as the superior mesenteric artery. No critical stenosis however is noted.  8. Multiple lucent lesions one of which may represent a pathologic fracture involving the right fifth rib. Whole-body PET/CT imaging is suggested for more complete evaluation.   Pt is an 86M former long time smoker, with a history of throat cancer (S/P radiation therapy) 7 years ago, HTN, dyslipidemia, afib on eliquis, HFrEF (3/2020 echo LVEF 30% w/ global hypokinesis) presenting with 1 month of 10lbs weight loss, generalized weakness, poor appetite and decreased PO intake, intermittent upper abd pain. Pt also endorsing a cough for past few days. Pt denies pain when eating or early satiety. Pt states he has been drinking ensure. Per daughter and chart review, pt has been following with Dr. Daily as there for malignancy and pt was sent to ED for work up. Pt denies any fever, chills, n/v/d, chest pain, SOB or dysuria. Of note, pt states he stopped taking his tamsulosin 2 days ago due to urinary frequency on lasix.    In ED, vitals: T 97.8, , /76, RR 20, O2 95% on RA. Labs significant for wbc 10.77, INR 1.81, Na 133, , trop 0.06, BNP 5638. EKG: Afib with LBBB, PVC, prolonged QTc.  CT abd/pelvis and chest:  1. New large right-sided pleural effusion with passive atelectasis of the right lower lobe. The central airways are patent.  2. Probable hematogenous metastatic pattern to the lungs with multiple nodules as well as a large right upper lobe mass which may represent a primary lung and/or synchronous lung carcinoma. There may be lymphangitic carcinomatosis involving the right upper right middle and right lower lobes.  3. Cardiomegaly and mild superimposed pulmonary venous congestion cannot be excluded.  4. Multiple intrahepatic low-attenuation lesions throughout represent metastatic disease and/or primary multifocal hepatocellular neoplasm in view the presence of a nodular hepatic contour which may suggest cirrhosis.  5. Severe diverticulosis coli of the sigmoid colon. Increased colonic stool burden compatible with obstipation.  6. Cholelithiasis.  7. Gastroparesis.  8. Enlarged prostate gland with mild bladder wall thickening to suggest a component of bladder outlet obstruction.  7. Marked diffuse calcified and noncalcified atheromatous plaque with narrowing of the origin of the right renal arteries as well as the superior mesenteric artery. No critical stenosis however is noted.  8. Multiple lucent lesions one of which may represent a pathologic fracture involving the right fifth rib. Whole-body PET/CT imaging is suggested for more complete evaluation.    In ED, pt received 1 L NS and IV lasix 20 mg x1. Pt is an 86M former long time smoker, with a history of throat cancer (S/P radiation therapy) 7 years ago, HTN, dyslipidemia, afib on eliquis, HFrEF (3/2020 echo LVEF 30% w/ global hypokinesis) presenting with 1 month of 10lbs weight loss, generalized weakness, poor appetite and decreased PO intake, intermittent upper abd pain. Pt also endorsing a cough for past few days. Pt denies pain when eating or early satiety. Pt states he has been drinking ensure. Per daughter and chart review, pt has been following with Dr. Daily and there was concern for malignancy so pt was sent to ED for work up. Pt denies any fever, chills, n/v/d, chest pain, SOB or dysuria. Of note, pt states he stopped taking his tamsulosin 2 days ago due to urinary frequency on lasix.    In ED, vitals: T 97.8, , /76, RR 20, O2 95% on RA. Labs significant for wbc 10.77, INR 1.81, Na 133, , trop 0.06, BNP 5638. EKG: Afib with LBBB, PVC, prolonged QTc.  CT abd/pelvis and chest:  1. New large right-sided pleural effusion with passive atelectasis of the right lower lobe. The central airways are patent.  2. Probable hematogenous metastatic pattern to the lungs with multiple nodules as well as a large right upper lobe mass which may represent a primary lung and/or synchronous lung carcinoma. There may be lymphangitic carcinomatosis involving the right upper right middle and right lower lobes.  3. Cardiomegaly and mild superimposed pulmonary venous congestion cannot be excluded.  4. Multiple intrahepatic low-attenuation lesions throughout represent metastatic disease and/or primary multifocal hepatocellular neoplasm in view the presence of a nodular hepatic contour which may suggest cirrhosis.  5. Severe diverticulosis coli of the sigmoid colon. Increased colonic stool burden compatible with obstipation.  6. Cholelithiasis.  7. Gastroparesis.  8. Enlarged prostate gland with mild bladder wall thickening to suggest a component of bladder outlet obstruction.  7. Marked diffuse calcified and noncalcified atheromatous plaque with narrowing of the origin of the right renal arteries as well as the superior mesenteric artery. No critical stenosis however is noted.  8. Multiple lucent lesions one of which may represent a pathologic fracture involving the right fifth rib. Whole-body PET/CT imaging is suggested for more complete evaluation.    In ED, pt received 1 L NS and IV lasix 20 mg x1. Pt is an 86M former long time smoker, with a history of throat cancer (S/P radiation therapy) 7 years ago, HTN, dyslipidemia, afib on eliquis, HFrEF (7/2019 echo LVEF 30% w/ LV global hypokinesis) presenting with 1 month of 10lbs weight loss, generalized weakness, poor appetite and decreased PO intake, intermittent upper abd pain. Pt also endorsing a cough for past few days. Pt denies pain when eating or early satiety. Pt states he has been drinking ensure. Per daughter and chart review, pt has been following with Dr. Daily and there was concern for malignancy so pt was sent to ED for work up. Pt denies any fever, chills, n/v/d, chest pain, SOB or dysuria. Of note, pt states he stopped taking his tamsulosin 2 days ago due to urinary frequency on lasix.    In ED, vitals: T 97.8, , /76, RR 20, O2 95% on RA. Labs significant for wbc 10.77, INR 1.81, Na 133, , trop 0.06, BNP 5638. EKG: Afib with LBBB, PVC, prolonged QTc.  CT abd/pelvis and chest:  1. New large right-sided pleural effusion with passive atelectasis of the right lower lobe. The central airways are patent.  2. Probable hematogenous metastatic pattern to the lungs with multiple nodules as well as a large right upper lobe mass which may represent a primary lung and/or synchronous lung carcinoma. There may be lymphangitic carcinomatosis involving the right upper right middle and right lower lobes.  3. Cardiomegaly and mild superimposed pulmonary venous congestion cannot be excluded.  4. Multiple intrahepatic low-attenuation lesions throughout represent metastatic disease and/or primary multifocal hepatocellular neoplasm in view the presence of a nodular hepatic contour which may suggest cirrhosis.  5. Severe diverticulosis coli of the sigmoid colon. Increased colonic stool burden compatible with obstipation.  6. Cholelithiasis.  7. Gastroparesis.  8. Enlarged prostate gland with mild bladder wall thickening to suggest a component of bladder outlet obstruction.  7. Marked diffuse calcified and noncalcified atheromatous plaque with narrowing of the origin of the right renal arteries as well as the superior mesenteric artery. No critical stenosis however is noted.  8. Multiple lucent lesions one of which may represent a pathologic fracture involving the right fifth rib. Whole-body PET/CT imaging is suggested for more complete evaluation.    In ED, pt received 1 L NS and IV lasix 20 mg x1.

## 2020-11-02 NOTE — H&P ADULT - PROBLEM SELECTOR PLAN 1
Pt presenting with 10 lbs weight loss, generalized weakness, poor PO intake x 1 month. Pt also endorsing cough. Former smoker. Pt found to have large right pleural effusion of CT likely malignant in the setting of large right upper lobe mass.  Pt currently denies any SOB. Pt desats to 86% when lying flat, now requiring 5 L NC.   - pulm consult in AM for thoracentesis, send studies for malignancy work up

## 2020-11-02 NOTE — H&P ADULT - PROBLEM SELECTOR PLAN 3
Pt found to have multiple intrahepatic low-attenuation lesions throughout that may represent metastatic disease and/or primary multifocal hepatocellular neoplasm with evidence of cirrhosis  - hep panel Pt found to have multiple intrahepatic low-attenuation lesions throughout that may represent metastatic disease and/or primary multifocal hepatocellular neoplasm with evidence of cirrhosis  - f/u hep panel, AFP

## 2020-11-02 NOTE — H&P ADULT - PROBLEM SELECTOR PLAN 4
- 10 lbs weight loss over 1 month. In the setting of poor PO intake and likely malignancy  - nutrition consult

## 2020-11-02 NOTE — ED PROVIDER NOTE - CLINICAL SUMMARY MEDICAL DECISION MAKING FREE TEXT BOX
86M, poor historian, former long time smoker, with a history of throat cancer (S/P radiation therapy) HTN, dyslipidemia, PVCs, LBBB, afib, CM, who presents with upper abdominal discomfort, weight loss of 10lbs, weakness, and decreased PO intake for the past 3-4 weeks, he also complains of left hip pain radiating down his leg for which he had an XRay of his left hip that was negative. He was sent in by his PMD for CT or chest abd pelvis to eval for malignancy or other intraabdominal process.

## 2020-11-02 NOTE — H&P ADULT - PROBLEM SELECTOR PLAN 5
- Pt does not appear to be in an acute exacerbation. BNP 5600. Echo from 7/2019 with LVEF 30% with global hypokinesis. Pt follows with Dr. Lee.  - c/w lisinopril 40 mg QD  - c/w lasix 40 mg QD  - not on beta blocker due to hx of bradycardia - Pt does not appear to be in an acute exacerbation. BNP 5600. Echo from 7/2019 with LVEF 30% with LV global hypokinesis. Pt follows with Dr. Lee.  - c/w lisinopril 40 mg QD  - c/w lasix 40 mg QD  - not on beta blocker due to hx of bradycardia

## 2020-11-02 NOTE — ED ADULT NURSE NOTE - OBJECTIVE STATEMENT
Patient is a 86 year old male who was sent to the ED by his PCP for generalized weakness x 2 weeks. Pts blood work showed high alkaline phosphate levels. Pt states, "I lost 10 pounds this month and I hardly eat." Hx hypertension, afib, throat cancer, high cholesterol. Pt denies chest pain, SOB, fever, chills, N/V.      pt referred to ED by PCP for generalized weakness, Blood work show " high alkaline phosphate"

## 2020-11-02 NOTE — H&P ADULT - NSHPSOCIALHISTORY_GEN_ALL_CORE
former smoker. Quit 15 years ago. 40 pack year hx. Alcohol occasional use. No illicit drugs. Lives at home with wife.

## 2020-11-02 NOTE — H&P ADULT - ASSESSMENT
Pt is an 86M former long time smoker, with a history of throat cancer (S/P radiation therapy) 7 years ago, HTN, dyslipidemia, afib on eliquis, HFrEF (3/2020 echo LVEF 30% w/ global hypokinesis) presenting with 1 month of 10lbs weight loss, generalized weakness, poor appetite and decreased PO intake, intermittent upper abd pain found to have large right pleural effusion w/ CT chest and abd concerning for malignancy.

## 2020-11-02 NOTE — H&P ADULT - NSICDXPASTMEDICALHX_GEN_ALL_CORE_FT
PAST MEDICAL HISTORY:  Afib     Chronic CHF     Dyslipidemia     History of throat cancer     HTN (hypertension)

## 2020-11-02 NOTE — H&P ADULT - NSHPLABSRESULTS_GEN_ALL_CORE
.  LABS:                         13.1   10.77 )-----------( 460      ( 02 Nov 2020 14:06 )             39.8     11-02    133<L>  |  91<L>  |  16  ----------------------------<  125<H>  3.8   |  21<L>  |  0.72    Ca    9.4      02 Nov 2020 14:06    TPro  7.0  /  Alb  3.5  /  TBili  0.5  /  DBili  x   /  AST  40  /  ALT  23  /  AlkPhos  322<H>  11-02    PT/INR - ( 02 Nov 2020 14:06 )   PT: 21.1 sec;   INR: 1.81          PTT - ( 02 Nov 2020 14:06 )  PTT:32.3 sec    CARDIAC MARKERS ( 02 Nov 2020 17:45 )  x     / 0.06 ng/mL / x     / x     / x      CARDIAC MARKERS ( 02 Nov 2020 14:06 )  x     / 0.06 ng/mL / x     / x     / x          Serum Pro-Brain Natriuretic Peptide: 5638 pg/mL (11-02 @ 14:06)    Lactate, Blood: 1.7 mmol/L (11-02 @ 22:42)      RADIOLOGY, EKG & ADDITIONAL TESTS: Reviewed.

## 2020-11-02 NOTE — ED PROVIDER NOTE - OBJECTIVE STATEMENT
86M, poor historian, former long time smoker, with a history of throat cancer (S/P radiation therapy) HTN, dyslipidemia, PVCs, LBBB, afib, CM, who presents with upper abdominal discomfort, weight loss of 10lbs, weakness, and decreased PO intake for the past 3-4 weeks, he also complains of left hip pain radiating down his leg for which he had an XRay of his left hip that was negative. He was sent in by his PMD for CT or chest abd pelvis to eval for malignancy or other intraabdominal process. No f/c, no cp/sob, no dizziness or syncope, no ha, neck pain, no n/t/w in ext.       ZioXT 2/3 - 2/17/20 Persistent atrial fibrillation, (46-)  ECHO 7/2019 EF 30%, global LV hypokinesis, Dilated RV & LV, LA 4.5 cm

## 2020-11-02 NOTE — H&P ADULT - PROBLEM SELECTOR PLAN 6
- c/w eliquis 5 mg BID  - was previously evaluated by Dr. Huber in 3/2020 for persistent AF. Consideration for PPM vs ICD at that time.

## 2020-11-02 NOTE — ED PROVIDER NOTE - PHYSICAL EXAMINATION
GEN: Elderly, thin, awake, alert, oriented to person, place, time/situation and in no apparent distress. NTAF  ENT: Airway patent, Nasal mucosa clear. Mouth with normal mucosa.  EYES: Clear bilaterally. PERRL, EOMI  RESPIRATORY: Breathing comfortably with normal RR. No W/C/R, no hypoxia or resp distress.  CARDIAC: Regular rate and rhythm, no M/R/G  ABDOMEN: Soft, upper abdominal TTP, +bowel sounds, no rebound, rigidity, or guarding.  MSK: Range of motion is not limited, no deformities noted.  NEURO: Alert and oriented, no focal deficits.  SKIN: Skin normal color for race, warm, dry and intact. No evidence of rash.  PSYCH: Alert and oriented to person, place, time/situation. normal mood and affect. no apparent risk to self or others.

## 2020-11-02 NOTE — H&P ADULT - PROBLEM SELECTOR PLAN 2
Pt found to have right upper lobe mass with multiple nodules concerning for either primary synchronous lung carcinoma vs metastatic from liver.  - f/u pulm consult Pt found to have right upper lobe mass with multiple nodules concerning for either primary synchronous lung carcinoma vs metastatic from liver. also with possibly bone mets with CT concerning for right 5th rib pathologic fx   - f/u pulm consult

## 2020-11-03 NOTE — PROGRESS NOTE ADULT - SUBJECTIVE AND OBJECTIVE BOX
INTERVAL HPI/OVERNIGHT EVENTS:  Patient seen and examined at bedside. Patient denies fever, chills, dizziness, weakness, HA, CP, SOB, N/V/D/C, changes in bowel movements, LE swelling.     VITALS  Vital Signs Last 24 Hrs  T(C): 36.6 (03 Nov 2020 05:25), Max: 36.6 (02 Nov 2020 13:34)  T(F): 97.8 (03 Nov 2020 05:25), Max: 97.9 (03 Nov 2020 00:48)  HR: 87 (03 Nov 2020 05:25) (85 - 102)  BP: 139/78 (03 Nov 2020 05:25) (137/76 - 150/73)  BP(mean): --  RR: 18 (03 Nov 2020 05:25) (18 - 20)  SpO2: 93% (03 Nov 2020 05:25) (86% - 96%)    CAPILLARY BLOOD GLUCOSE          PHYSICAL EXAM  General: WDWN, NAD, sitting comfortably in bed on 4L NC  HEENT: PERRL/ EOMI, no scleral icterus, MMM  Neck: Supple; no JVD  Respiratory: reduced right basilar breath sounds, no wheezes/crackles, no accessory muscle use  Cardiovascular: Regular rhythm/rate; +S1 +S2  Gastrointestinal: Soft, NTND, normoactive BS, no rebound, no guarding, no suprapubic tenderness  Extremities: WWP, +L varicose veins, no cyanosis, no clubbing, no edema, pulses equal  Neurological: A&Ox3, no gross focal deficits, follows commands  Skin: Normal temperature, warm, dry    MEDICATIONS  (STANDING):  amLODIPine   Tablet 5 milliGRAM(s) Oral daily  furosemide    Tablet 40 milliGRAM(s) Oral daily  gemfibrozil 600 milliGRAM(s) Oral two times a day  lisinopril 40 milliGRAM(s) Oral daily  polyethylene glycol 3350 17 Gram(s) Oral daily  senna 2 Tablet(s) Oral at bedtime  simvastatin 40 milliGRAM(s) Oral at bedtime    MEDICATIONS  (PRN):      No Known Allergies      LABS                        12.3   12.62 )-----------( 437      ( 03 Nov 2020 09:09 )             36.0     11-03    136  |  96  |  16  ----------------------------<  106<H>  3.7   |  25  |  0.79    Ca    9.0      03 Nov 2020 09:09  Phos  3.6     11-03  Mg     1.8     11-03    TPro  6.7  /  Alb  3.3  /  TBili  0.5  /  DBili  x   /  AST  36  /  ALT  21  /  AlkPhos  330<H>  11-03    PT/INR - ( 02 Nov 2020 14:06 )   PT: 21.1 sec;   INR: 1.81          PTT - ( 02 Nov 2020 14:06 )  PTT:32.3 sec    CARDIAC MARKERS ( 02 Nov 2020 17:45 )  x     / 0.06 ng/mL / x     / x     / x      CARDIAC MARKERS ( 02 Nov 2020 14:06 )  x     / 0.06 ng/mL / x     / x     / x            RADIOLOGY & ADDITIONAL TESTS: Reviewed INTERVAL HPI/OVERNIGHT EVENTS:  Patient seen and examined at bedside. Re-informed patient of clinical and diagnostic suspicion for metastatic cancer and new right sided pleural effusion and need for diagnostic thoracentesis. Says he currently has no complaints at this time. When asked about baseline functional status, says he ambulates with a cane and rolling walker. Cannot quantify number of blocks before he feels short of breath but does utilize the elevator to reach his apartment. Patient denies fever, chills, HA, CP, SOB, N/V/D/C, changes in bowel movements, LE swelling.     VITALS  Vital Signs Last 24 Hrs  T(C): 36.6 (03 Nov 2020 05:25), Max: 36.6 (02 Nov 2020 13:34)  T(F): 97.8 (03 Nov 2020 05:25), Max: 97.9 (03 Nov 2020 00:48)  HR: 87 (03 Nov 2020 05:25) (85 - 102)  BP: 139/78 (03 Nov 2020 05:25) (137/76 - 150/73)  BP(mean): --  RR: 18 (03 Nov 2020 05:25) (18 - 20)  SpO2: 93% (03 Nov 2020 05:25) (86% - 96%)    CAPILLARY BLOOD GLUCOSE          PHYSICAL EXAM  General: WDWN, NAD, sitting comfortably in bed on 4L NC  HEENT: PERRL/ EOMI, no scleral icterus, MMM  Neck: Supple; no JVD  Respiratory: reduced right basilar breath sounds, no wheezes/crackles, no accessory muscle use  Cardiovascular: Regular rhythm/rate; +S1 +S2  Gastrointestinal: Soft, NTND, normoactive BS, no rebound, no guarding, no suprapubic tenderness  Extremities: WWP, +L varicose veins, no cyanosis, no clubbing, no edema, pulses equal  Neurological: A&Ox3, no gross focal deficits, follows commands  Skin: Normal temperature, warm, dry    MEDICATIONS  (STANDING):  amLODIPine   Tablet 5 milliGRAM(s) Oral daily  furosemide    Tablet 40 milliGRAM(s) Oral daily  gemfibrozil 600 milliGRAM(s) Oral two times a day  lisinopril 40 milliGRAM(s) Oral daily  polyethylene glycol 3350 17 Gram(s) Oral daily  senna 2 Tablet(s) Oral at bedtime  simvastatin 40 milliGRAM(s) Oral at bedtime    MEDICATIONS  (PRN):      No Known Allergies      LABS                        12.3   12.62 )-----------( 437      ( 03 Nov 2020 09:09 )             36.0     11-03    136  |  96  |  16  ----------------------------<  106<H>  3.7   |  25  |  0.79    Ca    9.0      03 Nov 2020 09:09  Phos  3.6     11-03  Mg     1.8     11-03    TPro  6.7  /  Alb  3.3  /  TBili  0.5  /  DBili  x   /  AST  36  /  ALT  21  /  AlkPhos  330<H>  11-03    PT/INR - ( 02 Nov 2020 14:06 )   PT: 21.1 sec;   INR: 1.81          PTT - ( 02 Nov 2020 14:06 )  PTT:32.3 sec    CARDIAC MARKERS ( 02 Nov 2020 17:45 )  x     / 0.06 ng/mL / x     / x     / x      CARDIAC MARKERS ( 02 Nov 2020 14:06 )  x     / 0.06 ng/mL / x     / x     / x            RADIOLOGY & ADDITIONAL TESTS: Reviewed

## 2020-11-03 NOTE — CHART NOTE - TREATMENT: THE FOLLOWING DIET HAS BEEN RECOMMENDED
Diet, DASH/TLC:   Sodium & Cholesterol Restricted  Supplement Feeding Modality:  Oral  Ensure Enlive Cans or Servings Per Day:  2       Frequency:  Daily (11-03-20 @ 12:25) [Pending Verification By Attending]  Diet, DASH/TLC:   Sodium & Cholesterol Restricted (11-02-20 @ 22:37) [Active]

## 2020-11-03 NOTE — DIETITIAN INITIAL EVALUATION ADULT. - PROBLEM SELECTOR PLAN 2
Pt found to have right upper lobe mass with multiple nodules concerning for either primary synchronous lung carcinoma vs metastatic from liver. also with possibly bone mets with CT concerning for right 5th rib pathologic fx   - f/u pulm consult

## 2020-11-03 NOTE — CONSULT NOTE ADULT - ATTENDING COMMENTS
Acute hypoxia with right side pleural effusion with multiple lung metastasis with liver metastasis (possible throat malignancy again, not clear at present). Family (daughter and wife) along with patient has decided not to pursue treatment for cancer at present. Palliative team on board. Plan for pleural catheter on Thursday. D/c eliquis. Rest as above.

## 2020-11-03 NOTE — DIETITIAN INITIAL EVALUATION ADULT. - PROBLEM SELECTOR PLAN 5
- Pt does not appear to be in an acute exacerbation. BNP 5600. Echo from 7/2019 with LVEF 30% with LV global hypokinesis. Pt follows with Dr. Lee.  - c/w lisinopril 40 mg QD  - c/w lasix 40 mg QD  - not on beta blocker due to hx of bradycardia

## 2020-11-03 NOTE — CONSULT NOTE ADULT - PROBLEM SELECTOR RECOMMENDATION 2
Pt found to have right upper lobe mass with multiple nodules concerning for either primary synchronous lung carcinoma vs metastatic from liver. also with possibly bone mets with CT concerning for right 5th rib pathologic fx   -appreciate primary team, pulm team management  -agree pleurex if possible, will improve SOB and quality of life  -continue supplemental oxygen for comfort Pt found to have right upper lobe mass with multiple nodules concerning for either primary synchronous lung carcinoma vs metastatic from liver. also with possibly bone mets with CT concerning for right 5th rib pathologic fx   -appreciate primary team, pulm team management  -agree pleurex if possible, will improve SOB and quality of life  -continue supplemental oxygen for comfort, decreased anxiety

## 2020-11-03 NOTE — CONSULT NOTE ADULT - SUBJECTIVE AND OBJECTIVE BOX
HPI:  Pt is an 86M former long time smoker, with a history of throat cancer (S/P radiation therapy) 7 years ago, HTN, dyslipidemia, afib on eliquis, HFrEF (7/2019 echo LVEF 30% w/ LV global hypokinesis) presenting with 1 month of 10lbs weight loss, generalized weakness, poor appetite and decreased PO intake, intermittent upper abd pain. Pt also endorsing a cough for past few days. Pt denies pain when eating or early satiety. Pt states he has been drinking ensure. Per daughter and chart review, pt has been following with Dr. Daily and there was concern for malignancy so pt was sent to ED for work up. Pt denies any fever, chills, n/v/d, chest pain, SOB or dysuria. Of note, pt states he stopped taking his tamsulosin 2 days ago due to urinary frequency on lasix.    In ED, vitals: T 97.8, , /76, RR 20, O2 95% on RA. Labs significant for wbc 10.77, INR 1.81, Na 133, , trop 0.06, BNP 5638. EKG: Afib with LBBB, PVC, prolonged QTc.  CT abd/pelvis and chest:  1. New large right-sided pleural effusion with passive atelectasis of the right lower lobe. The central airways are patent.  2. Probable hematogenous metastatic pattern to the lungs with multiple nodules as well as a large right upper lobe mass which may represent a primary lung and/or synchronous lung carcinoma. There may be lymphangitic carcinomatosis involving the right upper right middle and right lower lobes.  3. Cardiomegaly and mild superimposed pulmonary venous congestion cannot be excluded.  4. Multiple intrahepatic low-attenuation lesions throughout represent metastatic disease and/or primary multifocal hepatocellular neoplasm in view the presence of a nodular hepatic contour which may suggest cirrhosis.  5. Severe diverticulosis coli of the sigmoid colon. Increased colonic stool burden compatible with obstipation.  6. Cholelithiasis.  7. Gastroparesis.  8. Enlarged prostate gland with mild bladder wall thickening to suggest a component of bladder outlet obstruction.  7. Marked diffuse calcified and noncalcified atheromatous plaque with narrowing of the origin of the right renal arteries as well as the superior mesenteric artery. No critical stenosis however is noted.  8. Multiple lucent lesions one of which may represent a pathologic fracture involving the right fifth rib. Whole-body PET/CT imaging is suggested for more complete evaluation.    In ED, pt received 1 L NS and IV lasix 20 mg x1.  (02 Nov 2020 21:57)      PAST MEDICAL & SURGICAL HISTORY:  Dyslipidemia  Chronic CHF  History of throat cancer  Afib  HTN (hypertension)    No significant past surgical history    FAMILY HISTORY:   Reviewed and found non contributory in mother or father    SOCIAL HISTORY: former smoker. originally from [place]. Moved to  when 20 years old. 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 Wife, Idalia Watters, very involved in pt's care. Daughter Michelle, also at bedside and involved in GOC and advance care planning     ROS:    Dyspnea (Nitish 0-10):                        N/V (Y/N):                              Secretions (Y/N) :                Agitation(Y/N):   Pain (Y/N):       -Location:  -Radiating:  -Quality:  -Duration: and Frequency:  -Provocation  -Palliation:  -24h worst intensity:  -24h least intensity:  -Current intensity:  -Level of pain acceptable:  -Impact on ADLs:    General:  Denied  HEENT:    Denied  Neck:  Denied  CVS:  Denied  Resp:  Denied  GI:  Denied Last BM:   :  Denied  Musc:  Denied  Neuro:  Denied  Psych:  Denied  Skin:  Denied  Lymph:  Denied    Allergies    No Known Allergies    Intolerances      Opiate Naive (Y/N): Y  -iStop reviewed (Y/N): Yes.   No Rx found on iStop review (Ref#:           )    Medications:      MEDICATIONS  (STANDING):  amLODIPine   Tablet 5 milliGRAM(s) Oral daily  artificial tears (preservative free) Ophthalmic Solution 1 Drop(s) Both EYES daily  enoxaparin Injectable 40 milliGRAM(s) SubCutaneous every 24 hours  gemfibrozil 600 milliGRAM(s) Oral two times a day  lisinopril 40 milliGRAM(s) Oral daily  polyethylene glycol 3350 17 Gram(s) Oral daily  senna 2 Tablet(s) Oral at bedtime  simvastatin 40 milliGRAM(s) Oral at bedtime    MEDICATIONS  (PRN):      Labs:    CBC:                        12.3   12.62 )-----------( 437      ( 03 Nov 2020 09:09 )             36.0     CMP:    11-03    136  |  96  |  16  ----------------------------<  106<H>  3.7   |  25  |  0.79    Ca    9.0      03 Nov 2020 09:09  Phos  3.6     11-03  Mg     1.8     11-03    TPro  6.7  /  Alb  3.3  /  TBili  0.5  /  DBili  x   /  AST  36  /  ALT  21  /  AlkPhos  330<H>  11-03   Albumin, Serum: 3.3 g/dL (11-03-20 @ 09:09)    PT/INR - ( 02 Nov 2020 14:06 )   PT: 21.1 sec;   INR: 1.81          PTT - ( 02 Nov 2020 14:06 )  PTT:32.3 sec       Imaging:  Reviewed    PEx:  T(C): 37.6 (11-03-20 @ 13:52), Max: 37.6 (11-03-20 @ 13:52)  HR: 84 (11-03-20 @ 13:52) (84 - 93)  BP: 134/75 (11-03-20 @ 13:52) (134/75 - 150/73)  RR: 18 (11-03-20 @ 13:52) (18 - 20)  SpO2: 94% (11-03-20 @ 13:52) (86% - 96%)  Wt(kg): 78 kg    General: WDWN, NAD, sitting comfortably in bed on 4L NC  HEENT: PERRL/ EOMI, no scleral icterus, MMM  Neck: Supple; no JVD  Respiratory: reduced right basilar breath sounds, no wheezes/crackles, no accessory muscle use  Cardiovascular: Regular rhythm/rate; +S1 +S2  Gastrointestinal: Soft, NTND, normoactive BS, no rebound, no guarding, no suprapubic tenderness  Extremities: WWP, +L varicose veins, no cyanosis, no clubbing, no edema, pulses equal  Neurological: A&Ox3, no gross focal deficits, follows commands  Skin: Normal temperature, warm, dry    Preadmit Karnofsky: 50 %             Current Karnofsky:   30  %  http://www.npcrc.org/files/news/karnofsky_performance_scale.pdf   http://www.npcrc.org/files/news/palliative_performance_scale_PPSv2.pdf      BMI: 24  Wt: 78  Cachexia (Y/N):  N    ADVANCE DIRECTIVES     Full Code --conversation deferred at initial consult     MOLST not on file in [facility]     HCP, DPOA, Living Will: no formal form not in [facility]    Decision maker: The patient is able to participate in complex medical decision making conversations.   Legal surrogate: wife, Idalia Watters    What is most important to patient: "A bright joey day ahead."   What worries patient the most: "I try not to think too much about worries... cloudy, rainy days"   Is patient at peace?: yes    GOALS OF CARE DISCUSSION       Palliative care info/counseling provided       See GOC notes below 	             Documentation of GOC: 	none at visit     REFERRALS: patient and family support to assist wife and daughter's conversation with grandchildren/children re: pt's diagnosis;  music therapy HPI:  Pt is an 86M former long time smoker, with a history of throat cancer (S/P radiation therapy) 7 years ago, HTN, dyslipidemia, afib on eliquis, HFrEF (7/2019 echo LVEF 30% w/ LV global hypokinesis) presenting with 1 month of 10lbs weight loss, generalized weakness, poor appetite and decreased PO intake, intermittent upper abd pain. Pt also endorsing a cough for past few days. Pt denies pain when eating or early satiety. Pt states he has been drinking ensure. Per daughter and chart review, pt has been following with Dr. Daily and there was concern for malignancy so pt was sent to ED for work up. Pt denies any fever, chills, n/v/d, chest pain, SOB or dysuria. Of note, pt states he stopped taking his tamsulosin 2 days ago due to urinary frequency on lasix.    In ED, vitals: T 97.8, , /76, RR 20, O2 95% on RA. Labs significant for wbc 10.77, INR 1.81, Na 133, , trop 0.06, BNP 5638. EKG: Afib with LBBB, PVC, prolonged QTc.  CT abd/pelvis and chest:  1. New large right-sided pleural effusion with passive atelectasis of the right lower lobe. The central airways are patent.  2. Probable hematogenous metastatic pattern to the lungs with multiple nodules as well as a large right upper lobe mass which may represent a primary lung and/or synchronous lung carcinoma. There may be lymphangitic carcinomatosis involving the right upper right middle and right lower lobes.  3. Cardiomegaly and mild superimposed pulmonary venous congestion cannot be excluded.  4. Multiple intrahepatic low-attenuation lesions throughout represent metastatic disease and/or primary multifocal hepatocellular neoplasm in view the presence of a nodular hepatic contour which may suggest cirrhosis.  5. Severe diverticulosis coli of the sigmoid colon. Increased colonic stool burden compatible with obstipation.  6. Cholelithiasis.  7. Gastroparesis.  8. Enlarged prostate gland with mild bladder wall thickening to suggest a component of bladder outlet obstruction.  7. Marked diffuse calcified and noncalcified atheromatous plaque with narrowing of the origin of the right renal arteries as well as the superior mesenteric artery. No critical stenosis however is noted.  8. Multiple lucent lesions one of which may represent a pathologic fracture involving the right fifth rib. Whole-body PET/CT imaging is suggested for more complete evaluation.    In ED, pt received 1 L NS and IV lasix 20 mg x1.  (02 Nov 2020 21:57)      PAST MEDICAL & SURGICAL HISTORY:  Dyslipidemia  Chronic CHF  History of throat cancer  Afib  HTN (hypertension)    No significant past surgical history    FAMILY HISTORY:   Reviewed and found non contributory in mother or father    SOCIAL HISTORY: former smoker. originally from [place]. Moved to  when 20 years old. 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 Wife, Idalia Watters, very involved in pt's care. Daughter Michelle, also at bedside and involved in GOC and advance care planning. 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 He also enjoys various shows on the [employer] channel, which include various black and white classic Western films and TV shows. He has a favorite recliner in his living room where he enjoys relaxing and watching his shows.     ROS:    Dyspnea (Nitish 0-10):      4 with exertion                  N/V (Y/N):            Y after eating 50% of meals  Secretions (Y/N) :         N       Agitation(Y/N): N  Pain (Y/N):     N    General:  Denied  HEENT:    Denied  Neck:  Denied  CVS:  Denied  Resp:  Denied  GI:  Denied    :  Denied  Musc:  more weak, fatigued   Neuro:  Denied  Psych:  calm, stoic   Skin:  Denied  Lymph:  Denied    Allergies    No Known Allergies    Intolerances      Opiate Naive (Y/N): Y  -iStop reviewed (Y/N): Yes.   No Rx found on iStop review    Medications:      MEDICATIONS  (STANDING):  amLODIPine   Tablet 5 milliGRAM(s) Oral daily  artificial tears (preservative free) Ophthalmic Solution 1 Drop(s) Both EYES daily  enoxaparin Injectable 40 milliGRAM(s) SubCutaneous every 24 hours  gemfibrozil 600 milliGRAM(s) Oral two times a day  lisinopril 40 milliGRAM(s) Oral daily  polyethylene glycol 3350 17 Gram(s) Oral daily  senna 2 Tablet(s) Oral at bedtime  simvastatin 40 milliGRAM(s) Oral at bedtime    MEDICATIONS  (PRN):      Labs:    CBC:                        12.3   12.62 )-----------( 437      ( 03 Nov 2020 09:09 )             36.0     CMP:    11-03    136  |  96  |  16  ----------------------------<  106<H>  3.7   |  25  |  0.79    Ca    9.0      03 Nov 2020 09:09  Phos  3.6     11-03  Mg     1.8     11-03    TPro  6.7  /  Alb  3.3  /  TBili  0.5  /  DBili  x   /  AST  36  /  ALT  21  /  AlkPhos  330<H>  11-03   Albumin, Serum: 3.3 g/dL (11-03-20 @ 09:09)    PT/INR - ( 02 Nov 2020 14:06 )   PT: 21.1 sec;   INR: 1.81          PTT - ( 02 Nov 2020 14:06 )  PTT:32.3 sec       Imaging:  Reviewed    PEx:  T(C): 37.6 (11-03-20 @ 13:52), Max: 37.6 (11-03-20 @ 13:52)  HR: 84 (11-03-20 @ 13:52) (84 - 93)  BP: 134/75 (11-03-20 @ 13:52) (134/75 - 150/73)  RR: 18 (11-03-20 @ 13:52) (18 - 20)  SpO2: 94% (11-03-20 @ 13:52) (86% - 96%)  Wt(kg): 78 kg    General: WDWN, NAD, sitting comfortably in bed on 4L NC  HEENT: PERRL/ EOMI, no scleral icterus, MMM  Neck: Supple; no JVD  Respiratory: reduced right basilar breath sounds, no wheezes/crackles, no accessory muscle use  Cardiovascular: Regular rhythm/rate; +S1 +S2  Gastrointestinal: Soft, NTND, normoactive BS, no rebound, no guarding, no suprapubic tenderness  Extremities: WWP, +L varicose veins, no cyanosis, no clubbing, no edema, pulses equal  Neurological: A&Ox3, no gross focal deficits, follows commands  Skin: Normal temperature, warm, dry    Preadmit Karnofsky: 50 %             Current Karnofsky:   30  %  http://www.npcrc.org/files/news/karnofsky_performance_scale.pdf   http://www.npcrc.org/files/news/palliative_performance_scale_PPSv2.pdf      BMI: 24  Wt: 78  Cachexia (Y/N):  N    ADVANCE DIRECTIVES     Full Code --conversation deferred at initial consult, see notes below      MOLST not on file in [facility]     HCP, DPOA, Living Will: no formal form not in [facility]    Decision maker: The patient is able to participate in complex medical decision making conversations.   Legal surrogate: wife, Idalia Watters    What is most important to patient: "Bright and joey days ahead."   What worries patient the most: "I try not to think too much about my worries... I guess cloudy & rainy days"   Is patient at peace?: yes    GOALS OF CARE DISCUSSION       Palliative care info/counseling provided       See GOC notes below 	             Documentation of GOC: 	none at visit     REFERRALS: patient and family support to assist wife and daughter's conversation with grandchildren/children re: pt's diagnosis;  music therapy

## 2020-11-03 NOTE — PROGRESS NOTE ADULT - SUBJECTIVE AND OBJECTIVE BOX
Patient was seen and examined by me at bedside. I agree with resident's note, subjective, objective physical exam, assessment and plan with following modifications/additions.    Greater than 35 minutes spent on total encounter; more than 50% of the visit was spent counseling and/or coordinating care by the attending physician.    86M former long time smoker, with a history of throat cancer (S/P radiation therapy 7 years ago), HTN, dyslipidemia, afib on eliquis, HFrEF (3/2020 echo LVEF 30% w/ global hypokinesis) presenting with 1 month of weightloss and shortness of breath, found to have likely metastatic lung ca with large R pleural effusion  -Palliative involvement given likely advanced stage lung cancer and goc discussion  -Pulm involvement for large volume thoracentesis for comfort/diagnosis, on eliquis stopped this am so likely can be done tomorrow and EBUS as outpt if want further workup  -CHF hx- on POCUS collapsable IVC no pulm edema noted, euvolemic to dry at this time, can continue ACE and hold home lasix for now  -Mild hyponatremia improved with fluids, unlikely SIADH- trend Na daily for now  -Afib- hold noac for thora, ok without rate control   -DVT px- on SCDs for now (high risk for clot) while pending thora   -Code status- full code, goals pending as above    Mele Pinto MD [phone]

## 2020-11-03 NOTE — PROGRESS NOTE ADULT - PROBLEM SELECTOR PLAN 10
F: s/p 1 L NS in ED  E: replete prn  N: DASH/TLC  DVT: on eliquis for AF  full code F: none  E: replete as needed  N: DASH/TLC  DVT: holding eliquis for thoracentesis on Thursday    Code status: FULL CODE  Dispo: [facility]ERT F: none  E: replete as needed  N: DASH/TLC  DVT: SCDs in place, holding eliquis for thoracentesis on Thursday    Code status: FULL CODE  Dispo: [facility]

## 2020-11-03 NOTE — CHART NOTE - NSCHARTNOTEFT_GEN_A_CORE
advance to regular diet and add 3 Ensure enlive  Consider appetite stimulant due to lack of appetite/  Patient has lost 10+ lbs in 1 month per family.

## 2020-11-03 NOTE — CONSULT NOTE ADULT - PROBLEM SELECTOR RECOMMENDATION 9
Unilateral (Right ) moderate to large, simple looking. Etiology most likely malignancy given history of cancer, smoking history. CT is concerning for malignancy given multiple spiculated nodules, no significant mediastinal lymphadenopathy. he also has multiple hepatic lesions and possible lytic lesion on the right rib. POCUS shoes global decrease in LV systolic function but does not seen that he is CHF exacerbation.     - Would need  diagnostic and therapeutic thoracentesis, but on eliquis.   discontinued for 48-72 hrs prior to procedure. Patient should be placed on heparin gtt. Unilateral (Right ) moderate to large, simple looking. Etiology most likely malignancy given history of cancer, smoking history. CT is concerning for malignancy given multiple spiculated nodules, no significant mediastinal lymphadenopathy. He also has multiple hepatic lesions and possible lytic lesion on the right rib. POCUS shows global decrease in LV systolic function but does not seen that he is CHF exacerbation.     - Would need to remove pleural fluid for diagnostic and therapeutic reason. Discussed with family about option of doing thoracentesis or placing indwelling pleural catheter (PleurX). Given high suspicion of malignant pleural effusion and goals of treatment by family, informed decision to place IPC was taken. Risks and benefits were discussed in detail.   - On eliquis for a Fib, would need to wait for at least 48 hrs before any minimum invasive procedure.   - Stop eliquis , put him on heparin drip. PleurX scheduled for thursday 11/05 at 9 AM. Stop heparin 4 AM 11/05.   -NPO after midnight night before , please get coagulation panel that AM, EKG, negative COVID with in 72 hrs prior to the scheduled procedure.   - sensitivity of Cytology is 60-65% for one sample, can be up to 80-85 % with 3 different sample at different times. Probably would need tissue to further characterize malignancy if family pursues treatment.

## 2020-11-03 NOTE — PROGRESS NOTE ADULT - PROBLEM SELECTOR PLAN 8
- c/w lisinopril 40 mg QD  - c/w amlodipine 5 mg QD - c/w lisinopril 40 mg QD  - c/w amlodipine 5 mg QD  - holding home lasix as patient euvolemic on exam and was found to be hyponatremic on admission

## 2020-11-03 NOTE — PROGRESS NOTE ADULT - PROBLEM SELECTOR PLAN 7
- Na 133. Likely in setting of hypovolemia 2/2 to poor PO intake.  - s/p 1 L NS  - f/u AM Na Na 133. Likely in setting of hypovolemia 2/2 to poor PO intake.  - s/p 1 L NS  - f/u AM Na Na 133. Likely in setting of hypovolemia 2/2 to poor PO intake.  - s/p 1 L NS  - AM Na increased to 136 Na 133. Likely in setting of hypovolemia 2/2 to poor PO intake.  - s/p 1 L NS  - AM Na increased to 136  - continue to monitor

## 2020-11-03 NOTE — PROGRESS NOTE ADULT - PROBLEM SELECTOR PLAN 6
- c/w eliquis 5 mg BID  - was previously evaluated by Dr. Hubre in 3/2020 for persistent AF. Consideration for PPM vs ICD at that time. Patient on eliquis 5 mg BID for afib  - was previously evaluated by Dr. Huber in 3/2020 for persistent AF. Consideration for PPM vs ICD at that time.  - holding all AC for now

## 2020-11-03 NOTE — PROGRESS NOTE ADULT - PROBLEM SELECTOR PLAN 4
Pt with 10 lbs weight loss over 1 month. In the setting of poor PO intake and likely malignancy  - f/u nutrition recs Pt with 10 lbs weight loss over 1 month. In the setting of poor PO intake and likely malignancy  - per nutrition recs add Ensure Enlive TID and consider appetite stimulant

## 2020-11-03 NOTE — CONSULT NOTE ADULT - PROBLEM SELECTOR RECOMMENDATION 3
In addition to the EM visit, an advance care planning meeting was performed  Start time: 2:45 p  End time: 3:15 p  Total time: 30 min  A face to face meeting to discuss advance care planning was held today regarding: RAPHAEL NAIK  Primary decision maker: patient  Alternate/surrogate: wife, Idalia   Discussed advance directives including, but not limited to, healthcare proxy and code status.  Decision regarding code status:  conversation deferred at initial consult, continue full code  Documentation completed today: n/a --see GOC note above

## 2020-11-03 NOTE — PROGRESS NOTE ADULT - ASSESSMENT
87 y/o Male former long time smoker PMHx of throat cancer (S/P radiation therapy) 7 years ago, HTN, HLD, afib on eliquis, HFrEF (3/2020 echo LVEF 30% w/ global hypokinesis) presenting with 1 month of 10 lbs weight loss, generalized weakness, poor appetite, intermittent upper abd pain found to have large right pleural effusion w/ CT chest and abd concerning for malignancy.

## 2020-11-03 NOTE — CONSULT NOTE ADULT - ASSESSMENT
86-year-old male former heavy smoker with a history of throat cancer s/p radiation 7 years ago presenting with  recent weight loss, shortness of breath and found to be hypoxic and CT chest shows a moderate to large right sided pleural effusion with multiple nodular opacity.   Pulm consulted for the same and to assess for thoracentesis.     
86M former long time smoker, with a history of throat cancer (S/P radiation therapy 7 years ago), HTN, dyslipidemia, afib on eliquis, HFrEF (3/2020 echo LVEF 30% w/ global hypokinesis) presenting with 1 month of weightloss and shortness of breath, found to have likely metastatic lung ca with large R pleural effusion. Palliative Care consulted to assist with GOC and supportive care in setting of terminal cancer diagnosis.

## 2020-11-03 NOTE — CONSULT NOTE ADULT - SUBJECTIVE AND OBJECTIVE BOX
PULMONARY SERVICE INITIAL CONSULT NOTE    HPI:  86-year-old male with a history of throat cancer (S/P radiation therapy) 7 years ago, HTN, dyslipidemia, afib on eliquis, HFrEF (7/2019 echo LVEF 30% w/ LV global hypokinesis), former smoker, presenting with shortness of breath, cough, intermittent right upper abd and right sided chest pain, 1 month of 10lbs weight loss, generalized weakness, and poor appetite. Pt has been following with Dr. Daily and there was concern for malignancy so pt was sent to ED for work up. Pt denies any fever, chills, n/v/d, chest pain, SOB or dysuria. Of note, pt states he stopped taking his tamsulosin 2 days ago due to urinary frequency on lasix.    Pt was admitted to medicine and pulmonary consulted for multiple nodules on CT scan with right sided pleural effusion, assessment for diagnostic/therapeutic thoracentesis.     REVIEW OF SYSTEMS:  Constitutional: +Weight loss, no fever  HEENT:  No difficulty hearing, tinnitus, vertigo; No sinus or throat pain.  Neck: No pain, stiffness or neck swelling  Respiratory: +Cough, +Shortness of breath  Cardiovascular: +Right sided dull chest pain, no dizziness, no leg swelling  Gastrointestinal: +Right upper abd pain. No nausea, vomiting or hematemesis; No diarrhea or constipation. No melena or hematochezia.  Genitourinary: No dysuria, frequency, hematuria or incontinence  Neurological: No headaches, memory loss, loss of strength, numbness or tremors  Skin: No itching, burning, rashes or lesions   Musculoskeletal: No joint pain or swelling  Heme/Lymph: No easy bruising or bleeding gums  Allergy and Immunologic: No hives or eczema    PAST MEDICAL & SURGICAL HISTORY:  Dyslipidemia  Chronic CHF  Throat cancer s/p radiation 2013  Afib  HTN  Malignant melanoma on back s/p wide excision 2016  Bilateral cataract surgery 6/2020    FAMILY HISTORY: Colon cancer, breast cancer    SOCIAL HISTORY:  Smoking Status: [ ] Current, [x ] Former, [ ] Never  Pack Years: 40    MEDICATIONS:  Eliquis 5 mg BID  Lasix 40 mg QD  polyethylene glycol 3350 17 Gram(s) Oral daily  senna 2 Tablet(s) Oral at bedtime  gemfibrozil 600 milliGRAM(s) Oral two times a day  simvastatin 40 milliGRAM(s) Oral at bedtime  amLODIPine   Tablet 5 milliGRAM(s) Oral daily  lisinopril 40 milliGRAM(s) Oral daily  Potassium chloride 40 Meq TBCR    No Known Allergies    Vital Signs Last 24 Hrs  T(C): 36.6 (03 Nov 2020 05:25), Max: 36.6 (02 Nov 2020 13:34)  T(F): 97.8 (03 Nov 2020 05:25), Max: 97.9 (03 Nov 2020 00:48)  HR: 87 (03 Nov 2020 05:25) (85 - 102)  BP: 139/78 (03 Nov 2020 05:25) (137/76 - 150/73)  BP(mean): --  RR: 18 (03 Nov 2020 05:25) (18 - 20)  SpO2: 93% (03 Nov 2020 05:25) (86% - 96%)    PHYSICAL EXAM:  Constitutional: WDWN  Head: NC/AT  EENT: PERRL, anicteric sclera; oropharynx clear, MMM  Neck: radiation changes on neck, supple, no appreciable JVD  Respiratory: Decreased breath sounds on right hemithorax   Cardiovascular: +S1/S2, RRR  Gastrointestinal: soft, NT/ND; +BSx4  Extremities: WWP; no edema, clubbing or cyanosis  Vascular: 2+ radial, DP/PT pulses B/L  Neurological: AAOx3; no focal deficits    LABS:      CBC Full  -  ( 03 Nov 2020 09:09 )  WBC Count : 12.62 K/uL  RBC Count : 4.22 M/uL  Hemoglobin : 12.3 g/dL  Hematocrit : 36.0 %  Platelet Count - Automated : 437 K/uL  Mean Cell Volume : 85.3 fl  Mean Cell Hemoglobin : 29.1 pg  Mean Cell Hemoglobin Concentration : 34.2 gm/dL  Auto Neutrophil # : 10.63 K/uL  Auto Lymphocyte # : 0.76 K/uL  Auto Monocyte # : 1.09 K/uL  Auto Eosinophil # : 0.02 K/uL  Auto Basophil # : 0.05 K/uL  Auto Neutrophil % : 84.2 %  Auto Lymphocyte % : 6.0 %  Auto Monocyte % : 8.6 %  Auto Eosinophil % : 0.2 %  Auto Basophil % : 0.4 %    11-03    136  |  96  |  16  ----------------------------<  106<H>  3.7   |  25  |  0.79    Ca    9.0      03 Nov 2020 09:09  Phos  3.6     11-03  Mg     1.8     11-03    TPro  6.7  /  Alb  3.3  /  TBili  0.5  /  DBili  x   /  AST  36  /  ALT  21  /  AlkPhos  330<H>  11-03    PT/INR - ( 02 Nov 2020 14:06 )   PT: 21.1 sec;   INR: 1.81          PTT - ( 02 Nov 2020 14:06 )  PTT:32.3 sec    RADIOLOGY & ADDITIONAL STUDIES:  < from: CT Chest w/ IV Cont (11.02.20 @ 19:11) >  IMPRESSION:    1. New large right-sided pleural effusion with passive atelectasis of the right lower lobe. The central airways are patent.  2. Probable hematogenous metastatic pattern to the lungs with multiple nodules as well as a large right upper lobe mass which may represent a primary lung and/or synchronous lung carcinoma. There may be lymphangitic carcinomatosis involving the right upper right middle and right lower lobes.  3. Cardiomegaly and mild superimposed pulmonary venous congestion cannot be excluded.  4. Multiple intrahepatic low-attenuation lesions throughout represent metastatic disease and/or primary multifocal hepatocellular neoplasm in viewthe presence of a nodular hepatic contour which may suggest cirrhosis.  5. Severe diverticulosis coli of the sigmoid colon. Increased colonic stool burden compatible with obstipation.  6. Cholelithiasis.  7. Gastroparesis.  8. Enlarged prostate glandwith mild bladder wall thickening to suggest a component of bladder outlet obstruction.  7. Marked diffuse calcified and noncalcified atheromatous plaque with narrowing of the origin of the right renal arteries as well as the superior mesenteric artery. No critical stenosis however is noted.  8. Multiple lucent lesions one of which may represent a pathologic fracture involving the right fifth rib. Whole-body PET/CT imaging is suggested for more complete evaluation.    < end of copied text >

## 2020-11-03 NOTE — DIETITIAN INITIAL EVALUATION ADULT. - PROBLEM SELECTOR PLAN 3
Pt found to have multiple intrahepatic low-attenuation lesions throughout that may represent metastatic disease and/or primary multifocal hepatocellular neoplasm with evidence of cirrhosis  - f/u hep panel, AFP

## 2020-11-03 NOTE — DIETITIAN INITIAL EVALUATION ADULT. - ORAL INTAKE PTA/DIET HISTORY
poor po was only taking Ensure vanilla and some small amounts due to lack of appetite and reported taste alterations

## 2020-11-03 NOTE — CONSULT NOTE ADULT - PROBLEM SELECTOR RECOMMENDATION 9
progressive and metastatic lung cancer. on 11/2 ctap multiple lucent lesions including pathologic fracture involving right fifth rib  -consider PET scan to further evaluate bone mets   -if bone mets consider dex 4 mg PO [id number]  -if no bone mets recommend remeron 7.5 mg PO qHS standing progressive and metastatic lung cancer. on 11/2 ctap multiple lucent lesions including pathologic fracture involving right fifth rib  -consider PET scan to further evaluate bone mets   -if bone mets consider dex 4 mg PO [id number] --will also help with lethargy  -if no bone mets recommend remeron 7.5 mg PO qHS standing

## 2020-11-03 NOTE — CONSULT NOTE ADULT - PROBLEM SELECTOR RECOMMENDATION 4
-continue  -conversation deferred at initial consult, focus was on support, symptoms and exploring values and meaning  -based on consult, appears family's values and wishes align with allowing patient a  natural death in setting of cardiac event   -discuss 11/4 -continue  -conversation deferred at initial consult, focus was on support, symptoms and exploring values and meaning  -based on consult, appears family's values and wishes align with allowing patient a  natural death in setting of cardiac event   -to continue discussion 11/4

## 2020-11-03 NOTE — CONSULT NOTE ADULT - PROBLEM SELECTOR RECOMMENDATION 5
-pt and family appreciative and will continue to benefit from palliative care support and symptom in setting of metastatic cancer diagnosis with poor prognosis  -Support provided to patient and family. Patient to have access to supportive services during rest of hospital stay as the patient/family deemed necessary ie. Chaplaincy, Massage therapy, Music therapy, Patient and family supportive services, Palliative SW, etc.

## 2020-11-03 NOTE — PROGRESS NOTE ADULT - PROBLEM SELECTOR PLAN 2
Pt found to have right upper lobe mass with multiple nodules concerning for either primary synchronous lung carcinoma vs metastatic from liver. also with possibly bone mets with CT concerning for right 5th rib pathologic fx   - management as above. Pt found to have right upper lobe mass with multiple nodules concerning for either primary synchronous lung carcinoma vs metastatic from liver. also with possibly bone mets with CT concerning for right 5th rib pathologic fx   - management as above

## 2020-11-03 NOTE — PROGRESS NOTE ADULT - PROBLEM SELECTOR PLAN 1
Pt presenting with 10 lbs weight loss, generalized weakness, poor PO intake x 1 month. Pt also endorsing cough. Former smoker. Pt found to have large right pleural effusion of CT likely malignant in the setting of large right upper lobe mass.  Pt currently denies any SOB. Pt desats to 86% when lying flat, now requiring 5 L NC.  - Bedside POCUS revealed mod-large right sided pleural effusion with atelectatic lung findings. No pleural fluid noted on the left.   - Pulm team consulted for thoracentesis and send out studies for malignancy workup. Per team, first thoracentesis may yield 60% sensitivity with 80% and 85% on second and third sendouts, respectively.    - Given patient received Eliquis for afib this morning, thoracentesis may not be performed until tomorrow or Thursday. Pt presenting with 10 lbs weight loss, generalized weakness, poor PO intake x 1 month. Pt also endorsing cough. Former smoker. Pt found to have large right pleural effusion of CT likely malignant in the setting of large right upper lobe mass.  Pt currently denies any SOB. Pt desats to 86% when lying flat, now requiring 5 L NC.  - Bedside POCUS revealed mod-large right sided pleural effusion with atelectatic lung findings. No pleural fluid noted on the left.   - Pulm team consulted for thoracentesis and send out studies for malignancy workup. Per team, first thoracentesis may yield 60% sensitivity with 80% and 85% on second and third sendouts, respectively.    - Given patient received Eliquis for afib this morning, thoracentesis likely on Thursday. Pt presenting with 10 lbs weight loss, generalized weakness, poor PO intake x 1 month. Pt also endorsing cough. Former smoker. Pt found to have large right pleural effusion of CT likely malignant in the setting of large right upper lobe mass.  Pt currently denies any SOB. Pt desats to 86% when lying flat, now requiring 4 L NC.  - Bedside POCUS revealed mod-large right sided pleural effusion with atelectatic lung findings. No pleural fluid noted on the left.   - Pulm team consulted for thoracentesis and send out studies for malignancy workup. Per team, first thoracentesis may yield 60% sensitivity with 80% and 85% on second and third sendouts, respectively.    - Given patient received Eliquis for afib this morning, thoracentesis likely on Thursday.  - will continue to wean O2 as tolerated

## 2020-11-03 NOTE — DIETITIAN INITIAL EVALUATION ADULT. - OTHER INFO
87y/o male with history of throat CA, HTN Dyslipidemia ,AFib, HF presents with 1 month of 10+ weight loss poor po and abdominal pain .Found to have a large right pleural effusion on CT likely malignancy. with Hepatic lesion .Per wife and daughter, patient has no appetite and wants only Ensure to drink .Reported taste alterations. No N/V/D/C .Skin intact.100% of IBW at present weight. No noted malnutrition parameters on NFPE. Suspected mild malnutrition due to 10+ weight gain/inadequate energy intake and increased kcal demands for hypermetabolic state .RD will honor all food requests and will discuss potential use of appetite stimulant with MD.

## 2020-11-03 NOTE — PROGRESS NOTE ADULT - PROBLEM SELECTOR PLAN 3
Pt found to have multiple intrahepatic low-attenuation lesions throughout that may represent metastatic disease and/or primary multifocal hepatocellular neoplasm with evidence of cirrhosis  - f/u hep panel, AFP Pt found to have multiple intrahepatic low-attenuation lesions throughout that may represent metastatic disease and/or primary multifocal hepatocellular neoplasm with evidence of cirrhosis  - f/u hep panel, AFP  - will discuss biopsy of hepatic lesion as outpatient

## 2020-11-03 NOTE — PROGRESS NOTE ADULT - PROBLEM SELECTOR PLAN 5
Pt does not appear to be in an acute exacerbation. BNP 5600. Echo from 7/2019 with LVEF 30% with LV global hypokinesis. Pt follows with Dr. Lee.  - Bedside POCUS showed reduced left sided EF, no   - c/w lisinopril 40 mg QD  - c/w lasix 40 mg QD  - not on beta blocker due to hx of bradycardia Pt does not appear to be in an acute exacerbation. BNP 5600. Echo from 7/2019 with LVEF 30% with LV global hypokinesis. Pt follows with Dr. Lee.  - Bedside POCUS showed reduced left sided EF, no right heart strain and no evidence of RHF. IVC appeared patent and closed on inspiration.    - c/w lisinopril 40 mg QD  - c/w lasix 40 mg QD  - not on beta blocker due to hx of bradycardia

## 2020-11-03 NOTE — CONSULT NOTE ADULT - CONVERSATION DETAILS
In addition to the EM visit, an advance care planning meeting was performed  Start time: 2:45 p  End time: 3:15 p  Total time: 30 min  A face to face meeting to discuss advance care planning was held today regarding: RAPHAEL NAIK  Primary decision maker: patient  Alternate/surrogate: wife, Idalia   Discussed advance directives including, but not limited to, healthcare proxy and code status.  Decision regarding code status:  conversation deferred at initial consult, continue full code  Documentation completed today: n/a    Pt's chart and PMHx thoroughly reviewed. Conversation held at pt's bedside with this palliative NP, patient, his wife Idalia and daughter Michelle. Pt is stoic, but open and willing for conversation. Engaged in life review, pt describes his childhood in [place], working as a  in [employer] while in his 20s and the night he met his now wife at a dance when he was 22. He is hoping for "bright and joey days ahead", he is calm and in pleasant mood. 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 His wife added that pt loves to sing.     Engaged in life review, pt described his childhood in [place], working as a  in [employer] when he was 20, and meeting his wife Idalia at a dance when he was 22.  Pt increasingly fatigued and sleepy with conversation and left to rest.     Pt's wife and Idalia share they are worried, scared, and grieving following discussions that pt has metastatic lung cancer. His daughter Bessy describes that her greatest fear is that patient will be in pain or will suffer distressing symptoms such as shortness of breath from recurrent pleural effusions. His wife expresses feelings of guilt when she leaves pt alone in the house to run miscellaneous errands and openly shares that she's "not doing well" coping.  They both describe that they want to maintain pt's dignity as much as possible. They are open to conversations about advance care planning and welcome any information and resources about how they can feel most supported at home. Legacy work introduced, which Bessy appreciates. We discussed recording videos that her children (pts grandchildren) can have and revisit at major milestones in their lives ie weddings, graduations, or communions, which Bessy found comforting, but was also tearful; "I'm sorry, it's just it's my dad."    Code status conversation deferred. However, from initial consult, family appear to understand that pt's metastatic lung cancer is terminal and pt's comfort is most important to them. Will re address tomorrow.

## 2020-11-04 NOTE — DISCHARGE NOTE PROVIDER - CARE PROVIDERS DIRECT ADDRESSES
[email or web address]

## 2020-11-04 NOTE — PROGRESS NOTE ADULT - ASSESSMENT
85 y/o Male former long time smoker w/ PMHx of throat cancer (S/P radiation therapy) 7 years ago, HTN, HLD, afib on eliquis, HFrEF (3/2020 echo LVEF 30% w/ global hypokinesis) presenting with 1 month of 10 lbs weight loss, generalized weakness, poor appetite, intermittent upper abd pain found to have large right pleural effusion w/ CT chest and abd concerning for malignancy.

## 2020-11-04 NOTE — PROGRESS NOTE ADULT - PROBLEM SELECTOR PLAN 8
- c/w lisinopril 40 mg QD  - c/w amlodipine 5 mg QD  - holding home lasix as patient euvolemic on exam and was found to be hyponatremic on admission

## 2020-11-04 NOTE — PROGRESS NOTE ADULT - SUBJECTIVE AND OBJECTIVE BOX
INTERVAL HPI/OVERNIGHT EVENTS:  As per night team, patient's O2 saturation <94% so O2 uptitrated from 4 to 5L. Still pending PleurX placement by pulmonary team scheduled for tomorrow. Patient seen and examined at bedside. Currently without any complaints of fever, chills, HA, CP, SOB, N/V/D/C, changes in bowel movements, LE swelling.     VITALS  Vital Signs Last 24 Hrs  T(C): 36.4 (04 Nov 2020 06:02), Max: 37.6 (03 Nov 2020 13:52)  T(F): 97.6 (04 Nov 2020 06:02), Max: 99.7 (03 Nov 2020 13:52)  HR: 90 (04 Nov 2020 06:02) (84 - 90)  BP: 122/77 (04 Nov 2020 06:02) (122/77 - 134/75)  BP(mean): --  RR: 19 (04 Nov 2020 06:02) (18 - 19)  SpO2: 94% (04 Nov 2020 06:02) (93% - 94%)    CAPILLARY BLOOD GLUCOSE          PHYSICAL EXAM  General: elderly gentleman in NAD, sitting comfortably on 5L NC  HEENT: PERRL/ EOMI, no scleral icterus, MMM  Neck: Supple; no JVD  Respiratory: reduced right basilar breath sounds, no wheezes/crackles, no accessory muscle use  Cardiovascular: Regular rhythm/rate; +S1 +S2  Gastrointestinal: Soft, NTND, normoactive BS, no rebound, no guarding, no suprapubic tenderness  Extremities: WWP, +L varicose veins, no cyanosis, no clubbing, no edema, pulses equal  Neurological: A&Ox3, no gross focal deficits, follows commands  Skin: Normal temperature, warm, dry    MEDICATIONS  (STANDING):  amLODIPine   Tablet 5 milliGRAM(s) Oral daily  dronabinol 2.5 milliGRAM(s) Oral every 12 hours  enoxaparin Injectable 40 milliGRAM(s) SubCutaneous every 24 hours  gemfibrozil 600 milliGRAM(s) Oral two times a day  lisinopril 40 milliGRAM(s) Oral daily  mirtazapine 7.5 milliGRAM(s) Oral every 24 hours  polyethylene glycol 3350 17 Gram(s) Oral daily  senna 2 Tablet(s) Oral at bedtime  simvastatin 40 milliGRAM(s) Oral at bedtime    MEDICATIONS  (PRN):      No Known Allergies      LABS                        12.7   12.32 )-----------( 441      ( 04 Nov 2020 08:17 )             38.0     11-04    135  |  93<L>  |  18  ----------------------------<  124<H>  3.6   |  28  |  0.94    Ca    9.4      04 Nov 2020 08:17  Phos  3.8     11-04  Mg     2.0     11-04    TPro  6.7  /  Alb  3.2<L>  /  TBili  0.5  /  DBili  x   /  AST  36  /  ALT  21  /  AlkPhos  335<H>  11-04    PT/INR - ( 04 Nov 2020 08:17 )   PT: 18.8 sec;   INR: 1.60          PTT - ( 04 Nov 2020 08:17 )  PTT:30.2 sec    CARDIAC MARKERS ( 02 Nov 2020 17:45 )  x     / 0.06 ng/mL / x     / x     / x      CARDIAC MARKERS ( 02 Nov 2020 14:06 )  x     / 0.06 ng/mL / x     / x     / x            RADIOLOGY & ADDITIONAL TESTS: Reviewed

## 2020-11-04 NOTE — DISCHARGE NOTE PROVIDER - NSDCCPCAREPLAN_GEN_ALL_CORE_FT
PRINCIPAL DISCHARGE DIAGNOSIS  Diagnosis: Pleural effusion  Assessment and Plan of Treatment: On arrival to [facility] you reported a 10 lbs weight loss, generalized weakness, poor oral intake for one month. On imaging we found a large pleural effusion (fluid) on the right side of your lung field along with a large right upper lobe mass in that same lung. There is concern for metastatic cancer given your prior history of throat cancer and tobacco use. The pulmonary team was consulted and discussed with your family about doing either a thoracentesis or placing an indwelling pleural catheter (aka PleurX) for diagnostic and therapeutic purposes. You and your family ultimately decided on the PleurX catheter, which was placed on November 5th. We sent the fluids out for cytological analysis and will share the result with you once they return.   Instructions:  1. Given that your oxygen levels dropped on room air, we will be sending you home on oxygen.   2. Please follow-up with Dr. Ervin Mason (pulmonologist) as an outpatient to go over your pleural fluid cytology results.      SECONDARY DISCHARGE DIAGNOSES  Diagnosis: Weight loss  Assessment and Plan of Treatment: You reported a 10 pound weight loss and poor oral intake over the last 1 month. As mentioned above, this is likely secondary to your metastatic cancer. The Palliative Care team evaluated you and recommended to start you on appetite stimulators and moderate pain control. As such we will be sending you home on remeron and dronabinol. We also recommend Ensure Enlive three times per day.    Diagnosis: Atrial fibrillation  Assessment and Plan of Treatment: You were previously evaluated by Dr. Huber for persistent atrial fibrillation. Please continue taking your home eliquis.       Diagnosis: HTN (hypertension)  Assessment and Plan of Treatment: Please continue taking your home lisinopril, lasix, and amlodipine for blood pressure control. If you happen to take your pressures at home and the systolic pressure is less than 100 mmHg or heart rate less than 60 beats/min please hold your medications for that day.     PRINCIPAL DISCHARGE DIAGNOSIS  Diagnosis: Pleural effusion  Assessment and Plan of Treatment: On arrival to [facility] you reported a 10 lbs weight loss, generalized weakness, poor oral intake for one month. On imaging we found a large pleural effusion (fluid) on the right side of your lung field along with a large right upper lobe mass in that same lung. There is concern for metastatic cancer given your prior history of throat cancer and tobacco use. The pulmonary team was consulted and discussed with your family about doing either a thoracentesis or placing an indwelling pleural catheter (aka PleurX) for diagnostic and therapeutic purposes. You and your family ultimately decided on the PleurX catheter, which was placed on November 5th. We sent the fluids out for cytological analysis and will share the result with you once they return.   Instructions:  1. Given that your oxygen levels dropped on room air, we will be sending you home on oxygen.   2. Members of home hospice will drain any fluid from the catheter as needed.  3. The pulmonary team will discuss cytology results within the next few days.      SECONDARY DISCHARGE DIAGNOSES  Diagnosis: Weight loss  Assessment and Plan of Treatment: You reported a 10 pound weight loss and poor oral intake over the last 1 month. As mentioned above, this is likely secondary to your metastatic cancer. The Palliative Care team evaluated you and recommended to start you on appetite stimulators and moderate pain control. As such we will be sending you home on remeron and dronabinol. We also recommend Ensure Enlive three times per day.    Diagnosis: Atrial fibrillation  Assessment and Plan of Treatment: You were previously evaluated by Dr. Huber for persistent atrial fibrillation. Please continue taking your home eliquis.       Diagnosis: HTN (hypertension)  Assessment and Plan of Treatment: Please continue taking your home lisinopril, lasix, and amlodipine for blood pressure control. If you happen to take your pressures at home and the systolic pressure is less than 100 mmHg or heart rate less than 60 beats/min please hold your medications for that day.    Diagnosis: Hyperlipidemia  Assessment and Plan of Treatment: Please continue taking simvastatin for high cholesterol. Given that you are going home with home hospice we recommend stopping gemfibrozil as it can cause muscle aches when combined with simvastatin.     PRINCIPAL DISCHARGE DIAGNOSIS  Diagnosis: Pleural effusion  Assessment and Plan of Treatment: On arrival to [facility] you reported a 10 lbs weight loss, generalized weakness, poor oral intake for one month. On imaging we found a large pleural effusion (fluid) on the right side of your lung field along with a large right upper lobe mass in that same lung. There is concern for metastatic cancer given your prior history of throat cancer and tobacco use. The pulmonary team was consulted and discussed with your family about doing either a thoracentesis or placing an indwelling pleural catheter (aka PleurX) for diagnostic and therapeutic purposes. You and your family ultimately decided on the PleurX catheter, which was placed on November 5th. We sent the fluids out for cytological analysis and will share the result with you once they return.   Instructions:  1. Given that your oxygen levels dropped on room air, we will be sending you home on oxygen.   2. Members of home hospice will drain any fluid from the catheter as needed.  3. The pulmonary team will discuss cytology results within the next few days.  4. For pain control the palliative team prescribed you morphine and dexamethasone.      SECONDARY DISCHARGE DIAGNOSES  Diagnosis: Weight loss  Assessment and Plan of Treatment: You reported a 10 pound weight loss and poor oral intake over the last 1 month. As mentioned above, this is likely secondary to your metastatic cancer. The Palliative Care team evaluated you and recommended to start you on appetite stimulators and moderate pain control. As such we will be sending you home on remeron. We also recommend Ensure Enlive three times per day.    Diagnosis: Atrial fibrillation  Assessment and Plan of Treatment: You were previously evaluated by Dr. Huber for persistent atrial fibrillation. Please continue taking your home eliquis.       Diagnosis: HTN (hypertension)  Assessment and Plan of Treatment: Please continue taking your home lisinopril, lasix, and amlodipine for blood pressure control. If you happen to take your pressures at home and the systolic pressure is less than 100 mmHg or heart rate less than 60 beats/min please hold your medications for that day.    Diagnosis: Hyperlipidemia  Assessment and Plan of Treatment: Please continue taking simvastatin for high cholesterol. Given that you are going home with home hospice we recommend stopping gemfibrozil as it can cause muscle aches when combined with simvastatin.     PRINCIPAL DISCHARGE DIAGNOSIS  Diagnosis: Pleural effusion  Assessment and Plan of Treatment: On arrival to [facility] you reported a 10 lbs weight loss, generalized weakness, poor oral intake for one month. On imaging we found a large pleural effusion (fluid) on the right side of your lung field along with a large right upper lobe mass in that same lung. There is concern for metastatic cancer given your prior history of throat cancer and tobacco use. The pulmonary team was consulted and discussed with your family about doing either a thoracentesis or placing an indwelling pleural catheter (aka PleurX) for diagnostic and therapeutic purposes. You and your family ultimately decided on the PleurX catheter, which was placed on November 5th. We sent the fluids out for cytological analysis and will share the result with you once they return.   Instructions:  1. Given that your oxygen levels dropped on room air, we will be sending you home on oxygen.   2. Drain PleurX every day as long as output > 250 millimeters. Can decrease the frequency to every 2 days if output is less and you notice being more short of breath.   3. The pulmonary team will discuss cytology results within the next few days.  4. For pain control the palliative team prescribed you morphine and dexamethasone.      SECONDARY DISCHARGE DIAGNOSES  Diagnosis: Weight loss  Assessment and Plan of Treatment: You reported a 10 pound weight loss and poor oral intake over the last 1 month. As mentioned above, this is likely secondary to your metastatic cancer. The Palliative Care team evaluated you and recommended to start you on appetite stimulators and moderate pain control. As such we will be sending you home on remeron. We also recommend Ensure Enlive three times per day.    Diagnosis: Atrial fibrillation  Assessment and Plan of Treatment: You were previously evaluated by Dr. Huber for persistent atrial fibrillation. Please continue taking your home eliquis.       Diagnosis: HTN (hypertension)  Assessment and Plan of Treatment: Please continue taking your home lisinopril, lasix, and amlodipine for blood pressure control. If you happen to take your pressures at home and the systolic pressure is less than 100 mmHg or heart rate less than 60 beats/min please hold your medications for that day.    Diagnosis: Hyperlipidemia  Assessment and Plan of Treatment: Please continue taking simvastatin for high cholesterol. Given that you are going home with home hospice we recommend stopping gemfibrozil as it can cause muscle aches when combined with simvastatin.

## 2020-11-04 NOTE — PROGRESS NOTE ADULT - PROBLEM SELECTOR PLAN 2
Pt found to have right upper lobe mass with multiple nodules concerning for either primary synchronous lung carcinoma vs metastatic from liver. also with possibly bone mets with CT concerning for right 5th rib pathologic fx   - management as above

## 2020-11-04 NOTE — DISCHARGE NOTE PROVIDER - CARE PROVIDER_API CALL
Nathan Daily  INTERNAL MEDICINE  [address] [place] [address]  Phone: [phone]  Fax: [phone]  Established Patient  Follow Up Time: 2 weeks    Ervin Mason  CRITICAL CARE MEDICINE  [address] [place] [address]  Phone: [phone]  Fax: [phone]  Scheduled Appointment: 11/20/2020 10:00 AM

## 2020-11-04 NOTE — PROGRESS NOTE ADULT - PROBLEM SELECTOR PLAN 6
Patient on eliquis 5 mg BID for afib  - was previously evaluated by Dr. Huber in 3/2020 for persistent AF. Consideration for PPM vs ICD at that time.  - holding all AC for now Patient on eliquis 5 mg BID for afib  - was previously evaluated by Dr. Huber in 3/2020 for persistent AF. Consideration for PPM vs ICD at that time.  - holding eliquis for now

## 2020-11-04 NOTE — DISCHARGE NOTE PROVIDER - HOSPITAL COURSE
#Discharge: do not delete    85 y/o Male former long time smoker w/ PMHx of throat cancer (S/P radiation therapy) 7 years ago, HTN, HLD, afib on eliquis, HFrEF (3/2020 echo LVEF 30% w/ global hypokinesis) presenting with 1 month of 10 lbs weight loss, generalized weakness, poor appetite, intermittent upper abd pain found to have large right pleural effusion w/ CT chest and abd concerning for malignancy.    1. New large right-sided pleural effusion with passive atelectasis of the right lower lobe. The central airways are patent.  2. Probable hematogenous metastatic pattern to the lungs with multiple nodules as well as a large right upper lobe mass which may represent a primary lung and/or synchronous lung carcinoma. There may be lymphangitic carcinomatosis involving the right upper right middle and right lower lobes.  3. Cardiomegaly and mild superimposed pulmonary venous congestion cannot be excluded.  4. Multiple intrahepatic low-attenuation lesions throughout represent metastatic disease and/or primary multifocal hepatocellular neoplasm in view the presence of a nodular hepatic contour which may suggest cirrhosis.  5. Severe diverticulosis coli of the sigmoid colon. Increased colonic stool burden compatible with obstipation.  6. Cholelithiasis.      Problem List/Inpatient treatment course:           New medications:   Labs to be followed outpatient:   Exam to be followed outpatient:    #Discharge: do not delete    85 y/o Male former long time smoker w/ PMHx of throat cancer (S/P radiation therapy) 7 years ago, HTN, HLD, afib on eliquis, HFrEF (3/2020 echo LVEF 30% w/ global hypokinesis) presenting with 1 month of 10 lbs weight loss, generalized weakness, poor appetite, intermittent upper abd pain. CT chest, abd and pelvis showed new large R sided pleural effusion w/ passive atelectasis of RLL, probable hematogeonous metastatic pattern to the lungs with multiple nodules, multiple intrahepatic low-attenuation lesions throughout, and questionable pathologic fracture involving 5th right rib. Patient admitted for diagnostic and therapeutic indwelling pleural catheter (PleurX) with plan for home with home hospice.     Problem List/Inpatient treatment course:   #Pleural effusion.    - Pt presenting with 10 lbs weight loss, generalized weakness, poor PO intake x 1 month. Pt also endorsing cough. Former smoker. Pt found to have large right pleural effusion of CT likely malignant in the setting of large right upper lobe mass. Pt currently denies any SOB. Pt desats to 86% when lying flat, now requiring 4 L NC.  - Bedside POCUS revealed mod-large right sided pleural effusion with atelectatic lung findings. No pleural fluid noted on the left.   - Pulmonary team planning for indwelling pleural catheter (pleurx) tomorrow after discussing with family high suspicion of malignant pleural effusion. Would need to remove pleural fluid for diagnostic and therapeutic reason. Discussed with family about option of doing thoracentesis or placing indwelling pleural catheter (PleurX). Per pulmonary, first thoracentesis may yield 60% sensitivity with 80% and 85% on second and third sendouts, respectively.    - will continue to wean O2 as tolerated, likely to have improvement after pleurX   - NPO midnight with AM lovenox dose to be held.      Problem/Plan - 2:  ·  Problem: Lung mass.  Plan: Pt found to have right upper lobe mass with multiple nodules concerning for either primary synchronous lung carcinoma vs metastatic from liver. also with possibly bone mets with CT concerning for right 5th rib pathologic fx   - management as above.      Problem/Plan - 3:  ·  Problem: Hepatic lesion.  Plan: Pt found to have multiple intrahepatic low-attenuation lesions throughout that may represent metastatic disease and/or primary multifocal hepatocellular neoplasm with evidence of cirrhosis  - serum AFP wnl   - hep panel unremarkable  - will discuss option to biopsy hepatic or pulmonary lesions.      Problem/Plan - 4:  ·  Problem: Weight loss.  Plan: Pt with 10 lbs weight loss over 1 month. In the setting of poor PO intake and likely malignancy  - per nutrition recs add Ensure Enlive TID  - started on dronabinol and remeron per palliative recs.      Problem/Plan - 5:  ·  Problem: Chronic systolic congestive heart failure.  Plan: Pt does not appear to be in an acute exacerbation. BNP 5600. Echo from 7/2019 with LVEF 30% with LV global hypokinesis. Pt follows with Dr. Lee.  - Bedside POCUS showed reduced left sided EF, no right heart strain and no evidence of RHF. IVC appeared patent and closed on inspiration.    - c/w lisinopril 40 mg QD  - c/w lasix 40 mg QD  - not on beta blocker due to hx of bradycardia.      Problem/Plan - 6:  Problem: Afib. Plan: Patient on eliquis 5 mg BID for afib  - was previously evaluated by Dr. Huber in 3/2020 for persistent AF. Consideration for PPM vs ICD at that time.  - holding eliquis for now.     Problem/Plan - 7:  ·  Problem: Hyponatremia.  Plan: Na 133. Likely in setting of hypovolemia 2/2 to poor PO intake.  - s/p 1 L NS  - AM Na increased to 136  - continue to monitor.      Problem/Plan - 8:  ·  Problem: HTN (hypertension).  Plan: - c/w lisinopril 40 mg QD  - c/w amlodipine 5 mg QD  - holding home lasix as patient euvolemic on exam and was found to be hyponatremic on admission.      Problem/Plan - 9:  ·  Problem: Hyperlipidemia.  Plan: - c/w home simvastatin 40 mg.         New medications:   Labs to be followed outpatient:   Exam to be followed outpatient:    #Discharge: do not delete    85 y/o Male former long time smoker w/ PMHx of throat cancer (S/P radiation therapy) 7 years ago, HTN, HLD, afib on eliquis, HFrEF (3/2020 echo LVEF 30% w/ global hypokinesis) presenting with 1 month of 10 lbs weight loss, generalized weakness, poor appetite, intermittent upper abd pain. CT chest, abd and pelvis showed new large R sided pleural effusion w/ passive atelectasis of RLL, probable hematogeonous metastatic pattern to the lungs with multiple nodules, multiple intrahepatic low-attenuation lesions throughout, and questionable pathologic fracture involving 5th right rib. Patient admitted for diagnostic and therapeutic indwelling pleural catheter (PleurX) with plan for home with home hospice.     Problem List/Inpatient treatment course:   #Pleural effusion.    - Pt presenting with 10 lbs weight loss, generalized weakness, poor PO intake x 1 month. Pt also endorsing cough. Former smoker. Pt found to have large right pleural effusion of CT likely malignant in the setting of large right upper lobe mass. Pt currently denies any SOB. Pt desats to 86% when lying flat, now requiring 4 L NC.  - Bedside POCUS revealed mod-large right sided pleural effusion with atelectatic lung findings. No pleural fluid noted on the left.   - Pulmonary team plan for indwelling pleural catheter (pleurx) on 10/5 after discussing with family high suspicion of malignant pleural effusion. Would need to remove pleural fluid for diagnostic and therapeutic reason. Discussed with family about option of doing thoracentesis or placing indwelling pleural catheter (PleurX). Per pulmonary, first thoracentesis may yield 60% sensitivity with 80% and 85% on second and third sendouts, respectively.    - will continue to wean O2 as tolerated, will go home on oxygen  - Resumed eliquis after PleurX    #Lung mass.  Plan: Pt found to have right upper lobe mass with multiple nodules concerning for either primary synchronous lung carcinoma vs metastatic from liver. also with possibly bone mets with CT concerning for right 5th rib pathologic fx   - management as above.     #Hepatic lesion.  Plan: Pt found to have multiple intrahepatic low-attenuation lesions throughout that may represent metastatic disease and/or primary multifocal hepatocellular neoplasm with evidence of cirrhosis  - serum AFP wnl   - hep panel unremarkable  - Will f/u pleural fluid cytology. Given that patient agrees to home w/ home hospice can hold off on hepatic biopsy or EBUS at this time.     #Weight loss.  Plan: Pt with 10 lbs weight loss over 1 month. In the setting of poor PO intake and likely malignancy  - per nutrition recs add Ensure Enlive TID  - c/w dronabinol and remeron per palliative recs.     #Chronic systolic congestive heart failure.  Plan: Pt does not appear to be in an acute exacerbation. BNP 5600. Echo from 7/2019 with LVEF 30% with LV global hypokinesis. Pt follows with Dr. Lee.  - Bedside POCUS showed reduced left sided EF, no right heart strain and no evidence of RHF. IVC appeared patent and closed on inspiration.    - c/w lisinopril 40 mg QD  - c/w lasix 40 mg QD  - not on beta blocker due to hx of bradycardia.     #Afib. Plan: Patient on eliquis 5 mg BID for afib  - was previously evaluated by Dr. Huber in 3/2020 for persistent AF. Consideration for PPM vs ICD at that time.  - resumed home dose after PleurX    #HTN (hypertension).    - c/w lisinopril 40 mg QD  - c/w amlodipine 5 mg QD  - c/w lasix 40 mg QD    #Hyperlipidemia.    - c/w home simvastatin 40 mg  - can discontinue gemfibrozil given pt home w/ home hospice and myalgias can occur at anytime with combination fo statin and fibrates    New medications:   Labs to be followed outpatient:   Exam to be followed outpatient:    #Discharge: do not delete    85 y/o Male former long time smoker w/ PMHx of throat cancer (S/P radiation therapy) 7 years ago, HTN, HLD, afib on eliquis, HFrEF (3/2020 echo LVEF 30% w/ global hypokinesis) presenting with 1 month of 10 lbs weight loss, generalized weakness, poor appetite, intermittent upper abd pain. CT chest, abd and pelvis showed new large R sided pleural effusion w/ passive atelectasis of RLL, probable hematogeonous metastatic pattern to the lungs with multiple nodules, multiple intrahepatic low-attenuation lesions throughout, and questionable pathologic fracture involving 5th right rib. Patient admitted for diagnostic and therapeutic indwelling pleural catheter (PleurX) with plan for home with home hospice.     Problem List/Inpatient treatment course:   #Pleural effusion.    - Pt presenting with 10 lbs weight loss, generalized weakness, poor PO intake x 1 month. Pt also endorsing cough. Former smoker. Pt found to have large right pleural effusion of CT likely malignant in the setting of large right upper lobe mass. Pt currently denies any SOB. Pt desats to 86% when lying flat, now requiring 4 L NC.  - Bedside POCUS revealed mod-large right sided pleural effusion with atelectatic lung findings. No pleural fluid noted on the left.   - Pulmonary team plan for indwelling pleural catheter (pleurx) on 10/5 after discussing with family high suspicion of malignant pleural effusion. Would need to remove pleural fluid for diagnostic and therapeutic reason. Discussed with family about option of doing thoracentesis or placing indwelling pleural catheter (PleurX). Per pulmonary, first thoracentesis may yield 60% sensitivity with 80% and 85% on second and third sendouts, respectively.    - will continue to wean O2 as tolerated, will go home on oxygen  - Resumed eliquis after PleurX    #Lung mass.  Plan: Pt found to have right upper lobe mass with multiple nodules concerning for either primary synchronous lung carcinoma vs metastatic from liver. also with possible bone mets with CT concerning for right 5th rib pathologic fx   - management as above.     #Hepatic lesion.  Plan: Pt found to have multiple intrahepatic low-attenuation lesions throughout that may represent metastatic disease and/or primary multifocal hepatocellular neoplasm with evidence of cirrhosis  - serum AFP wnl   - hep panel unremarkable  - Will f/u pleural fluid cytology. Given that patient agrees to home w/ home hospice can hold off on hepatic biopsy or EBUS at this time.     #Weight loss.  Plan: Pt with 10 lbs weight loss over 1 month. In the setting of poor PO intake and likely malignancy  - per nutrition recs add Ensure Enlive TID  - c/w dronabinol and remeron per palliative recs.     #Chronic systolic congestive heart failure.  Plan: Pt does not appear to be in an acute exacerbation. BNP 5600. Echo from 7/2019 with LVEF 30% with LV global hypokinesis. Pt follows with Dr. Lee.  - Bedside POCUS showed reduced left sided EF, no right heart strain and no evidence of RHF. IVC appeared patent and closed on inspiration.    - c/w lisinopril 40 mg QD  - c/w lasix 40 mg QD  - not on beta blocker due to hx of bradycardia.     #Afib. Plan: Patient on eliquis 5 mg BID for afib  - was previously evaluated by Dr. Huber in 3/2020 for persistent AF. Consideration for PPM vs ICD at that time.  - resumed home dose after PleurX    #HTN (hypertension).    - c/w lisinopril 40 mg QD  - c/w amlodipine 5 mg QD  - c/w lasix 40 mg QD    #Hyperlipidemia.    - c/w home simvastatin 40 mg  - can discontinue gemfibrozil given pt home w/ home hospice and myalgias can occur at anytime with combination fo statin and fibrates    New medications: Remeron, dronabinol  Labs to be followed outpatient: pleural fluid cytology  Exam to be followed outpatient: PCP, Pulmonology   #Discharge: do not delete    85 y/o Male former long time smoker w/ PMHx of throat cancer (S/P radiation therapy) 7 years ago, HTN, HLD, afib on eliquis, HFrEF (3/2020 echo LVEF 30% w/ global hypokinesis) presenting with 1 month of 10 lbs weight loss, generalized weakness, poor appetite, intermittent upper abd pain. CT chest, abd and pelvis showed new large R sided pleural effusion w/ passive atelectasis of RLL, probable hematogeonous metastatic pattern to the lungs with multiple nodules, multiple intrahepatic low-attenuation lesions throughout, and questionable pathologic fracture involving 5th right rib. Patient admitted for diagnostic and therapeutic indwelling pleural catheter (PleurX) with plan for home with home hospice.     Problem List/Inpatient treatment course:   #Pleural effusion.    - Pt presenting with 10 lbs weight loss, generalized weakness, poor PO intake x 1 month. Pt also endorsing cough. Former smoker. Pt found to have large right pleural effusion of CT likely malignant in the setting of large right upper lobe mass. Pt currently denies any SOB. Pt desats to 86% when lying flat, now requiring 4 L NC.  - Bedside POCUS revealed mod-large right sided pleural effusion with atelectatic lung findings. No pleural fluid noted on the left.   - Pulmonary team plan for indwelling pleural catheter (pleurx) on 10/5 after discussing with family high suspicion of malignant pleural effusion. Would need to remove pleural fluid for diagnostic and therapeutic reason. Discussed with family about option of doing thoracentesis or placing indwelling pleural catheter (PleurX). Per pulmonary, first thoracentesis may yield 60% sensitivity with 80% and 85% on second and third sendouts, respectively.    - will continue to wean O2 as tolerated, will go home on oxygen  - Resumed eliquis after PleurX    #Lung mass.  Plan: Pt found to have right upper lobe mass with multiple nodules concerning for either primary synchronous lung carcinoma vs metastatic from liver. also with possible bone mets with CT concerning for right 5th rib pathologic fx   - management as above.     #Hepatic lesion.  Plan: Pt found to have multiple intrahepatic low-attenuation lesions throughout that may represent metastatic disease and/or primary multifocal hepatocellular neoplasm with evidence of cirrhosis  - serum AFP wnl   - hep panel unremarkable  - Will f/u pleural fluid cytology. Given that patient agrees to home w/ home hospice can hold off on hepatic biopsy or EBUS at this time.     #Weight loss.  Plan: Pt with 10 lbs weight loss over 1 month. In the setting of poor PO intake and likely malignancy  - per nutrition recs add Ensure Enlive TID  - c/w dronabinol and remeron per palliative recs.     #Chronic systolic congestive heart failure.  Plan: Pt does not appear to be in an acute exacerbation. BNP 5600. Echo from 7/2019 with LVEF 30% with LV global hypokinesis. Pt follows with Dr. Lee.  - Bedside POCUS showed reduced left sided EF, no right heart strain and no evidence of RHF. IVC appeared patent and closed on inspiration.    - c/w lisinopril 40 mg QD  - c/w lasix 40 mg QD  - not on beta blocker due to hx of bradycardia.     #Afib. Plan: Patient on eliquis 5 mg BID for afib  - was previously evaluated by Dr. Huber in 3/2020 for persistent AF. Consideration for PPM vs ICD at that time.  - resumed home dose after PleurX    #HTN (hypertension).    - c/w lisinopril 40 mg QD  - c/w amlodipine 5 mg QD  - c/w lasix 40 mg QD    #Hyperlipidemia.    - c/w home simvastatin 40 mg  - can discontinue gemfibrozil given pt home w/ home hospice and myalgias can occur at anytime with combination fo statin and fibrates    New medications: Remeron, dronabinol  Labs to be followed outpatient: pleural fluid cytology  Exam to be followed outpatient: none #Discharge: do not delete    85 y/o Male former long time smoker w/ PMHx of throat cancer (S/P radiation therapy) 7 years ago, HTN, HLD, afib on eliquis, HFrEF (3/2020 echo LVEF 30% w/ global hypokinesis) presenting with 1 month of 10 lbs weight loss, generalized weakness, poor appetite, intermittent upper abd pain. CT chest, abd and pelvis showed new large R sided pleural effusion w/ passive atelectasis of RLL, probable hematogeonous metastatic pattern to the lungs with multiple nodules, multiple intrahepatic low-attenuation lesions throughout, and questionable pathologic fracture involving 5th right rib. Patient admitted for diagnostic and therapeutic indwelling pleural catheter (PleurX) with plan for home with home hospice.     Problem List/Inpatient treatment course:   #Pleural effusion.    - Pt presenting with 10 lbs weight loss, generalized weakness, poor PO intake x 1 month. Pt also endorsing cough. Former smoker. Pt found to have large right pleural effusion of CT likely malignant in the setting of large right upper lobe mass. Pt currently denies any SOB. Pt desats to 86% when lying flat, now requiring 4 L NC.  - Bedside POCUS revealed mod-large right sided pleural effusion with atelectatic lung findings. No pleural fluid noted on the left.   - Pulmonary team plan for indwelling pleural catheter (pleurx) on 10/5 after discussing with family high suspicion of malignant pleural effusion. Would need to remove pleural fluid for diagnostic and therapeutic reason. Discussed with family about option of doing thoracentesis or placing indwelling pleural catheter (PleurX). Per pulmonary, first thoracentesis may yield 60% sensitivity with 80% and 85% on second and third sendouts, respectively.    - will continue to wean O2 as tolerated, will go home on oxygen  - Resumed eliquis after PleurX    #Lung mass.  Plan: Pt found to have right upper lobe mass with multiple nodules concerning for either primary synchronous lung carcinoma vs metastatic from liver. also with possible bone mets with CT concerning for right 5th rib pathologic fx   - management as above.     #Hepatic lesion.  Plan: Pt found to have multiple intrahepatic low-attenuation lesions throughout that may represent metastatic disease and/or primary multifocal hepatocellular neoplasm with evidence of cirrhosis  - serum AFP wnl   - hep panel unremarkable  - Will f/u pleural fluid cytology. Given that patient agrees to home w/ home hospice can hold off on hepatic biopsy or EBUS at this time.     #Weight loss.  Plan: Pt with 10 lbs weight loss over 1 month. In the setting of poor PO intake and likely malignancy  - per nutrition recs add Ensure Enlive TID  - c/w dronabinol and remeron per palliative recs.     #Chronic systolic congestive heart failure.  Plan: Pt does not appear to be in an acute exacerbation. BNP 5600. Echo from 7/2019 with LVEF 30% with LV global hypokinesis. Pt follows with Dr. Lee.  - Bedside POCUS showed reduced left sided EF, no right heart strain and no evidence of RHF. IVC appeared patent and closed on inspiration.    - c/w lisinopril 40 mg QD  - c/w lasix 40 mg QD  - not on beta blocker due to hx of bradycardia.     #Afib. Plan: Patient on eliquis 5 mg BID for afib  - was previously evaluated by Dr. Huber in 3/2020 for persistent AF. Consideration for PPM vs ICD at that time.  - resumed home dose after PleurX    #HTN (hypertension).    - c/w lisinopril 40 mg QD  - c/w amlodipine 5 mg QD  - c/w lasix 40 mg QD    #Hyperlipidemia.    - c/w home simvastatin 40 mg  - can discontinue gemfibrozil given pt home w/ home hospice and myalgias can occur at anytime with combination fo statin and fibrates    New medications: Remeron, dronabinol, home O2  Labs to be followed outpatient: pleural fluid cytology  Exam to be followed outpatient: none #Discharge: do not delete    87 y/o Male former long time smoker w/ PMHx of throat cancer (S/P radiation therapy) 7 years ago, HTN, HLD, afib on eliquis, HFrEF (3/2020 echo LVEF 30% w/ global hypokinesis) presenting with 1 month of 10 lbs weight loss, generalized weakness, poor appetite, intermittent upper abd pain. CT chest, abd and pelvis showed new large R sided pleural effusion w/ passive atelectasis of RLL, probable hematogeonous metastatic pattern to the lungs with multiple nodules, multiple intrahepatic low-attenuation lesions throughout, and questionable pathologic fracture involving 5th right rib. Patient admitted for diagnostic and therapeutic indwelling pleural catheter (PleurX) with plan for home with home hospice.     Problem List/Inpatient treatment course:   #Pleural effusion.    - Pt presenting with 10 lbs weight loss, generalized weakness, poor PO intake x 1 month. Pt also endorsing cough. Former smoker. Pt found to have large right pleural effusion of CT likely malignant in the setting of large right upper lobe mass. Pt currently denies any SOB. Pt desats to 86% when lying flat, now requiring 4 L NC.  - Bedside POCUS revealed mod-large right sided pleural effusion with atelectatic lung findings. No pleural fluid noted on the left.   - Pulmonary team plan for indwelling pleural catheter (pleurx) on 10/5 after discussing with family high suspicion of malignant pleural effusion. Would need to remove pleural fluid for diagnostic and therapeutic reason. Discussed with family about option of doing thoracentesis or placing indwelling pleural catheter (PleurX). Per pulmonary, first thoracentesis may yield 60% sensitivity with 80% and 85% on second and third sendouts, respectively.    - will continue to wean O2 as tolerated, will go home on oxygen  - Resumed eliquis after PleurX    #Lung mass.  Plan: Pt found to have right upper lobe mass with multiple nodules concerning for either primary synchronous lung carcinoma vs metastatic from liver. also with possible bone mets with CT concerning for right 5th rib pathologic fx   - management as above.     #Hepatic lesion.  Plan: Pt found to have multiple intrahepatic low-attenuation lesions throughout that may represent metastatic disease and/or primary multifocal hepatocellular neoplasm with evidence of cirrhosis  - serum AFP wnl   - hep panel unremarkable  - Will f/u pleural fluid cytology. Given that patient agrees to home w/ home hospice can hold off on hepatic biopsy or EBUS at this time.     #Weight loss.  Plan: Pt with 10 lbs weight loss over 1 month. In the setting of poor PO intake and likely malignancy  - per nutrition recs add Ensure Enlive TID  - c/w dronabinol and remeron per palliative recs.     #Chronic systolic congestive heart failure.  Plan: Pt does not appear to be in an acute exacerbation. BNP 5600. Echo from 7/2019 with LVEF 30% with LV global hypokinesis. Pt follows with Dr. Lee.  - Bedside POCUS showed reduced left sided EF, no right heart strain and no evidence of RHF. IVC appeared patent and closed on inspiration.    - c/w lisinopril 40 mg QD  - c/w lasix 40 mg QD  - not on beta blocker due to hx of bradycardia.     #Afib. Plan: Patient on eliquis 5 mg BID for afib  - was previously evaluated by Dr. Huber in 3/2020 for persistent AF. Consideration for PPM vs ICD at that time.  - resumed home dose after PleurX    #HTN (hypertension).    - c/w lisinopril 40 mg QD  - c/w amlodipine 5 mg QD  - c/w lasix 40 mg QD    #Hyperlipidemia.    - c/w home simvastatin 40 mg  - can discontinue gemfibrozil given pt home w/ home hospice and myalgias can occur at anytime with combination fo statin and fibrates    New medications: dexamethasone, morphine, Remeron, dronabinol, home O2  Labs to be followed outpatient: pleural fluid cytology  Exam to be followed outpatient: none #Discharge: do not delete    87 y/o Male former long time smoker w/ PMHx of throat cancer (S/P radiation therapy) 7 years ago, HTN, HLD, afib on eliquis, HFrEF (3/2020 echo LVEF 30% w/ global hypokinesis) presenting with 1 month of 10 lbs weight loss, generalized weakness, poor appetite, intermittent upper abd pain. CT chest, abd and pelvis showed new large R sided pleural effusion w/ passive atelectasis of RLL, probable hematogeonous metastatic pattern to the lungs with multiple nodules, multiple intrahepatic low-attenuation lesions throughout, and questionable pathologic fracture involving 5th right rib. Patient admitted for diagnostic and therapeutic indwelling pleural catheter (PleurX) with plan for home with home hospice.     Problem List/Inpatient treatment course:   #Pleural effusion.    - Pt presenting with 10 lbs weight loss, generalized weakness, poor PO intake x 1 month. Pt also endorsing cough. Former smoker. Pt found to have large right pleural effusion of CT likely malignant in the setting of large right upper lobe mass. Pt currently denies any SOB. Pt desats to 86% when lying flat, now requiring 4 L NC.  - Bedside POCUS revealed mod-large right sided pleural effusion with atelectatic lung findings. No pleural fluid noted on the left.   - Pulmonary team placed indwelling pleural catheter (pleurx) on 10/5 after discussing with family high suspicion of malignant pleural effusion. Would need to remove pleural fluid for diagnostic and therapeutic reason. Discussed with family about option of doing thoracentesis or placing indwelling pleural catheter (PleurX). Per pulmonary, first thoracentesis may yield 60% sensitivity with 80% and 85% on second and third sendouts, respectively.    - will continue to wean O2 as tolerated, will go home on oxygen  - instructed to drain PleurX every day as long as output >250 cc every day, can decrease freq to q2days if output is less and patient is SOB  - Resumed eliquis after PleurX    #Lung mass.  Plan: Pt found to have right upper lobe mass with multiple nodules concerning for either primary synchronous lung carcinoma vs metastatic from liver. also with possible bone mets with CT concerning for right 5th rib pathologic fx   - management as above.     #Hepatic lesion.  Plan: Pt found to have multiple intrahepatic low-attenuation lesions throughout that may represent metastatic disease and/or primary multifocal hepatocellular neoplasm with evidence of cirrhosis  - serum AFP wnl   - hep panel unremarkable  - Will f/u pleural fluid cytology. Given that patient agrees to home w/ home hospice can hold off on hepatic biopsy or EBUS at this time.     #Weight loss.  Plan: Pt with 10 lbs weight loss over 1 month. In the setting of poor PO intake and likely malignancy  - per nutrition recs add Ensure Enlive TID  - c/w dronabinol and remeron per palliative recs.     #Chronic systolic congestive heart failure.  Plan: Pt does not appear to be in an acute exacerbation. BNP 5600. Echo from 7/2019 with LVEF 30% with LV global hypokinesis. Pt follows with Dr. Lee.  - Bedside POCUS showed reduced left sided EF, no right heart strain and no evidence of RHF. IVC appeared patent and closed on inspiration.    - c/w lisinopril 40 mg QD  - c/w lasix 40 mg QD  - not on beta blocker due to hx of bradycardia.     #Afib. Plan: Patient on eliquis 5 mg BID for afib  - was previously evaluated by Dr. Huber in 3/2020 for persistent AF. Consideration for PPM vs ICD at that time.  - resumed home dose after PleurX    #HTN (hypertension).    - c/w lisinopril 40 mg QD  - c/w amlodipine 5 mg QD  - c/w lasix 40 mg QD    #Hyperlipidemia.    - c/w home simvastatin 40 mg  - can discontinue gemfibrozil given pt home w/ home hospice and myalgias can occur at anytime with combination fo statin and fibrates    New medications: dexamethasone, morphine, Remeron, dronabinol, home O2, PleurX supplies  Labs to be followed outpatient: pleural fluid cytology  Exam to be followed outpatient: none

## 2020-11-04 NOTE — DISCHARGE NOTE PROVIDER - NSDCFUADDAPPT_GEN_ALL_CORE_FT
1. We attempted to reach the office of Dr. Nathan Daily for a follow-up appointment, but were unsuccessful. The office can be reached at     2. 1. We attempted to reach the office of Dr. Nathan Daily for a follow-up appointment, but were unsuccessful. Please arrange for a visit in 1-2 weeks. The office can be reached at [phone].     2. We scheduled a pulmonology appointment with Dr. Ervin Mason on November 20th at 10:00 am on the 4th floor of [facility]. The office can be reached at [phone]. 1. No appointments are scheduled as you have elected for home care with home hospice. 1. No appointments are scheduled as you have elected for home care with home hospice.   2. The pulmonary team under Dr. Ervin Mason should call you to discuss the diagnostic studies from your pleural fluid.

## 2020-11-04 NOTE — DISCHARGE NOTE PROVIDER - NSDCMRMEDTOKEN_GEN_ALL_CORE_FT
amLODIPine 5 mg oral tablet: 1 tab(s) orally once a day  Eliquis 5 mg oral tablet: 1 tab(s) orally 2 times a day  gemfibrozil 600 mg oral tablet: 1 tab(s) orally 2 times a day  Lasix 40 mg oral tablet: 1 tab(s) orally once a day  lisinopril 40 mg oral tablet: 1 tab(s) orally once a day  simvastatin 40 mg oral tablet: 1 tab(s) orally once a day (at bedtime)  tamsulosin 0.4 mg oral capsule: 1 cap(s) orally once a day   amLODIPine 5 mg oral tablet: 1 tab(s) orally once a day  Eliquis 5 mg oral tablet: 1 tab(s) orally 2 times a day  Lasix 40 mg oral tablet: 1 tab(s) orally once a day  lisinopril 40 mg oral tablet: 1 tab(s) orally once a day  simvastatin 40 mg oral tablet: 1 tab(s) orally once a day (at bedtime)  tamsulosin 0.4 mg oral capsule: 1 cap(s) orally once a day   amLODIPine 5 mg oral tablet: 1 tab(s) orally once a day  dexamethasone 4 mg oral tablet: Please take 4 milligram tablet at 6:00 AM and 2:00 PM.   Eliquis 5 mg oral tablet: 1 tab(s) orally 2 times a day  Lasix 40 mg oral tablet: 1 tab(s) orally once a day  lisinopril 40 mg oral tablet: 1 tab(s) orally once a day  mirtazapine 7.5 mg oral tablet: 1 tab(s) orally every 24 hours MDD:1  morphine 20 mg/5 mL oral solution: 1.25 milliliter(s) orally every 4 hours, As Needed -for severe pain - for shortness of breath and/or wheezing MDD:7.5 mL  simvastatin 40 mg oral tablet: 1 tab(s) orally once a day (at bedtime)  tamsulosin 0.4 mg oral capsule: 1 cap(s) orally once a day   amLODIPine 5 mg oral tablet: 1 tab(s) orally once a day  dexamethasone 4 mg oral tablet: Please take 4 milligram tablet at 6:00 AM and 2:00 PM.   dronabinol 2.5 mg oral capsule: 1 cap(s) orally every 12 hours MDD:2  Eliquis 5 mg oral tablet: 1 tab(s) orally 2 times a day  Lasix 40 mg oral tablet: 1 tab(s) orally once a day  lisinopril 40 mg oral tablet: 1 tab(s) orally once a day  mirtazapine 7.5 mg oral tablet: 1 tab(s) orally every 24 hours MDD:1  morphine 20 mg/5 mL oral solution: 1.25 milliliter(s) orally every 4 hours, As Needed -for severe pain - for shortness of breath and/or wheezing MDD:7.5 mL  simvastatin 40 mg oral tablet: 1 tab(s) orally once a day (at bedtime)  tamsulosin 0.4 mg oral capsule: 1 cap(s) orally once a day

## 2020-11-04 NOTE — PROGRESS NOTE ADULT - PROBLEM SELECTOR PLAN 3
Pt found to have multiple intrahepatic low-attenuation lesions throughout that may represent metastatic disease and/or primary multifocal hepatocellular neoplasm with evidence of cirrhosis  - f/u hep panel, AFP  - will discuss biopsy of hepatic lesion as outpatient Pt found to have multiple intrahepatic low-attenuation lesions throughout that may represent metastatic disease and/or primary multifocal hepatocellular neoplasm with evidence of cirrhosis  - serum AFP wnl   - hep panel unremarkable  - will discuss option to biopsy hepatic or pulmonary lesions

## 2020-11-04 NOTE — PROGRESS NOTE ADULT - SUBJECTIVE AND OBJECTIVE BOX
CC: generalized weakness poor appetite     SUBJECTIVE: weak, ill appearing man sitting up in bed on NC, no acute distress.    discussed code status. in brief, pt resistant to having conversation as he "doesn't want to talk about bad things like that". also appears to express wishes that align with allowing a natural death in the event of a catastrophic event.     ROS:  DYSPNEA: 1 with rest and supplemental oxygen, 4 with exertion walking to bathroom   NAUS/VOM: Y after eating 50% of meals	  SECRETIONS: N	  AGITATION: N  Pain (Y/N):  N       OTHER REVIEW OF SYSTEMS: negative unless specified above.    PEx:  T(C): 36.4 (11-04-20 @ 06:02), Max: 37.6 (11-03-20 @ 13:52)  HR: 90 (11-04-20 @ 06:02) (84 - 90)  BP: 122/77 (11-04-20 @ 06:02) (122/77 - 134/75)  RR: 19 (11-04-20 @ 06:02) (18 - 19)  SpO2: 94% (11-04-20 @ 06:02) (93% - 94%)  Wt(kg): --     General: WDWN, NAD, sitting comfortably in bed on 4L NC  HEENT: PERRL/ EOMI, no scleral icterus, MMM  Neck: Supple; no JVD  Respiratory: reduced right basilar breath sounds, no wheezes/crackles, no accessory muscle use  Cardiovascular: Regular rhythm/rate; +S1 +S2  Gastrointestinal: Soft, NTND, normoactive BS, no rebound, no guarding, no suprapubic tenderness  Extremities: WWP, +L varicose veins, no cyanosis, no clubbing, no edema, pulses equal  Neurological: A&Ox3, no gross focal deficits, follows commands  Skin: Normal temperature, warm, dry	     ALLERGIES: No Known Allergies      OPIATE NAÏVE (Y/N): Y    MEDICATIONS: REVIEWED  MEDICATIONS  (STANDING):  amLODIPine   Tablet 5 milliGRAM(s) Oral daily  dronabinol 2.5 milliGRAM(s) Oral every 12 hours  enoxaparin Injectable 40 milliGRAM(s) SubCutaneous every 24 hours  gemfibrozil 600 milliGRAM(s) Oral two times a day  lisinopril 40 milliGRAM(s) Oral daily  mirtazapine 7.5 milliGRAM(s) Oral every 24 hours  polyethylene glycol 3350 17 Gram(s) Oral daily  senna 2 Tablet(s) Oral at bedtime  simvastatin 40 milliGRAM(s) Oral at bedtime    MEDICATIONS  (PRN):    LABS: REVIEWED  CBC:                        12.7   12.32 )-----------( 441      ( 04 Nov 2020 08:17 )             38.0     CMP:    11-04    135  |  93<L>  |  18  ----------------------------<  124<H>  3.6   |  28  |  0.94    Ca    9.4      04 Nov 2020 08:17  Phos  3.8     11-04  Mg     2.0     11-04    TPro  6.7  /  Alb  3.2<L>  /  TBili  0.5  /  DBili  x   /  AST  36  /  ALT  21  /  AlkPhos  335<H>  11-04  Albumin, Serum: 3.2 g/dL (11-04-20 @ 08:17)      IMAGING: REVIEWED    < from: CT Chest w/ IV Cont (11.02.20 @ 19:11) >  IMPRESSION:  1. New large right-sided pleural effusion with passive atelectasis of the right lower lobe. The central airways are patent.  2. Probable hematogenous metastatic pattern to the lungs with multiple nodules as well as a large right upper lobe mass which may represent a primary lung and/or synchronous lung carcinoma. There may be lymphangitic carcinomatosis involving the right upper right middle and right lower lobes.  3. Cardiomegaly and mild superimposed pulmonary venous congestion cannot be excluded.  4. Multiple intrahepatic low-attenuation lesions throughout represent metastatic disease and/or primary multifocal hepatocellular neoplasm in viewthe presence of a nodular hepatic contour which may suggest cirrhosis.  5. Severe diverticulosis coli of the sigmoid colon. Increased colonic stool burden compatible with obstipation.  6. Cholelithiasis.  7. Gastroparesis.  8. Enlarged prostate glandwith mild bladder wall thickening to suggest a component of bladder outlet obstruction.  7. Marked diffuse calcified and noncalcified atheromatous plaque with narrowing of the origin of the right renal arteries as well as the superior mesenteric artery. No critical stenosis however is noted.  8. Multiple lucent lesions one of which may represent a pathologic fracture involving the right fifth rib. Whole-body PET/CT imaging is suggested for more complete evaluation.    ADVANCED DIRECTIVES:            FULL CODE                LIVING WILL, DPOA, HCP form  not on file in [facility].    Decision maker: The patient is able to participate in complex medical decision making conversations.   Legal surrogate: wife, Idalia Watters    What is most important to patient: "Bright and joey days ahead." "Seeing and spending time with grandchildren"  What worries patient the most: "I try not to think too much about my worries... I guess cloudy & rainy days"   Is patient at peace?: yes    GOALS OF CARE DISCUSSION       Palliative care info/counseling provided	           Advanced Directives addressed, see note below  	           Agency discussed: home hospice, [place]        Documentation of GOC: none this visit    PSYCHOSOCIAL-SPIRITUAL ASSESSMENT: Reviewed, Care plan unchanged

## 2020-11-04 NOTE — PROGRESS NOTE ADULT - ATTENDING COMMENTS
Plan for pleural catheter placement tomorrow for palliation. Eliquis on hold. Bedside lung ultrasound was done again. Rest as above.

## 2020-11-04 NOTE — DISCHARGE NOTE PROVIDER - NSDCFUSCHEDAPPT_GEN_ALL_CORE_FT
RAPHAEL NAIK ; 11/20/2020 ; [facility] [address] [place] [address] RAPHAEL NAIK ; 11/12/2020 ; [facility] [address]  RAPHAEL NAIK ; 11/20/2020 ; [facility] [address]

## 2020-11-04 NOTE — PROGRESS NOTE ADULT - PROBLEM SELECTOR PLAN 5
Pt does not appear to be in an acute exacerbation. BNP 5600. Echo from 7/2019 with LVEF 30% with LV global hypokinesis. Pt follows with Dr. Lee.  - Bedside POCUS showed reduced left sided EF, no right heart strain and no evidence of RHF. IVC appeared patent and closed on inspiration.    - c/w lisinopril 40 mg QD  - c/w lasix 40 mg QD  - not on beta blocker due to hx of bradycardia

## 2020-11-04 NOTE — PROGRESS NOTE ADULT - ASSESSMENT
86M former long time smoker, with a history of throat cancer (S/P radiation therapy 7 years ago), HTN, dyslipidemia, afib on eliquis, HFrEF (3/2020 echo LVEF 30% w/ global hypokinesis) presenting with 1 month of weightloss and shortness of breath, found to have likely metastatic lung ca with large R pleural effusion. Palliative Care consulted to assist with GOC and supportive care in setting of metastatic cancer diagnosis.

## 2020-11-04 NOTE — PROGRESS NOTE ADULT - PROBLEM SELECTOR PLAN 7
Na 133. Likely in setting of hypovolemia 2/2 to poor PO intake.  - s/p 1 L NS  - AM Na increased to 136  - continue to monitor

## 2020-11-04 NOTE — PROGRESS NOTE ADULT - PROBLEM SELECTOR PLAN 1
progressive and metastatic lung cancer. on 11/2 ctap multiple lucent lesions including pathologic fracture involving right fifth rib  -can consider PET scan to further evaluate bone mets   -if bone mets consider dex 4 mg PO [id number] --will also help with lethargy  -if no bone mets recommend remeron 7.5 mg PO qHS standing.

## 2020-11-04 NOTE — PROGRESS NOTE ADULT - PROBLEM SELECTOR PLAN 3
In addition to the EM visit, an advance care planning meeting was performed  Start time: 11:30 a  End time: 12:00 p  Total time: 30 min  A face to face meeting to discuss advance care planning was held today regarding: RAPHAEL NAIK  Primary decision maker: patient  Alternate/surrogate: wife, Idalia   Discussed advance directives including, but not limited to, healthcare proxy and code status.  Decision regarding code status: continue full code  Documentation completed today: n/a    Explored pt's understanding of current clinical status. Pt understands that he "has fluid in his lungs that will be drained" tomorrow to help him breathe.   Pt understands that cancer is causing pleural effusions, but resistant to further discussion.    Code status discussed. Patient welcoming for psychosocial support but overall is resistant to having conversation about code status. When it is explained, pt states he doesn't want to talk about "bad things like that". He also shares that he "knows [he] doesn't have much time left." Pt concerned about discussing advance directives with his wife; he appears to feel that these difficult conversations will be distressing, emotional for his wife.    Plan:  -continue to readdress code status  -communication of this discussion with Dev Bennett RN as above   -full code

## 2020-11-04 NOTE — PROGRESS NOTE ADULT - PROBLEM SELECTOR PLAN 4
-see GOC above  -communication with DINA Méndez from [facility] regarding today's code status discussion. Pt aware that code status will continue to be addressed

## 2020-11-04 NOTE — PROGRESS NOTE ADULT - SUBJECTIVE AND OBJECTIVE BOX
PULMONARY CONSULT FOLLOW-UP NOTE    INTERVAL HISTORY:   Reviewed chart and overnight events; patient seen and examined at bedside.   no new complaints    on 2 L NC    no cough   minimal right sided chest pain.     MEDICATIONS:  Pulmonary:    Antimicrobials:    Anticoagulants:  enoxaparin Injectable 40 milliGRAM(s) SubCutaneous every 24 hours    Cardiac:  amLODIPine   Tablet 5 milliGRAM(s) Oral daily  lisinopril 40 milliGRAM(s) Oral daily      Allergies    No Known Allergies    Intolerances        Vital Signs Last 24 Hrs  T(C): 36.4 (04 Nov 2020 06:02), Max: 37.6 (03 Nov 2020 13:52)  T(F): 97.6 (04 Nov 2020 06:02), Max: 99.7 (03 Nov 2020 13:52)  HR: 90 (04 Nov 2020 06:02) (84 - 90)  BP: 122/77 (04 Nov 2020 06:02) (122/77 - 134/75)  BP(mean): --  RR: 19 (04 Nov 2020 06:02) (18 - 19)  SpO2: 94% (04 Nov 2020 06:02) (93% - 94%)        PHYSICAL EXAM:  Constitutional: WDWN  HEENT: NC/AT; PERRL, anicteric sclera; MMM  Neck: supple  Cardiovascular: +S1/S2, RRR  Respiratory: decreased breath sounds right side.   Gastrointestinal: soft, NT/ND; +BSx4  Extremities: WWP; no edema, clubbing or cyanosis  Vascular: 2+ radial, DP/PT pulses B/L  Neurological: AAOx3; no focal deficits    LABS:      CBC Full  -  ( 04 Nov 2020 08:17 )  WBC Count : 12.32 K/uL  RBC Count : 4.39 M/uL  Hemoglobin : 12.7 g/dL  Hematocrit : 38.0 %  Platelet Count - Automated : 441 K/uL  Mean Cell Volume : 86.6 fl  Mean Cell Hemoglobin : 28.9 pg  Mean Cell Hemoglobin Concentration : 33.4 gm/dL  Auto Neutrophil # : 10.07 K/uL  Auto Lymphocyte # : 1.00 K/uL  Auto Monocyte # : 1.12 K/uL  Auto Eosinophil # : 0.02 K/uL  Auto Basophil # : 0.04 K/uL  Auto Neutrophil % : 81.7 %  Auto Lymphocyte % : 8.1 %  Auto Monocyte % : 9.1 %  Auto Eosinophil % : 0.2 %  Auto Basophil % : 0.3 %    11-04    135  |  93<L>  |  18  ----------------------------<  124<H>  3.6   |  28  |  0.94    Ca    9.4      04 Nov 2020 08:17  Phos  3.8     11-04  Mg     2.0     11-04    TPro  6.7  /  Alb  3.2<L>  /  TBili  0.5  /  DBili  x   /  AST  36  /  ALT  21  /  AlkPhos  335<H>  11-04    PT/INR - ( 04 Nov 2020 08:17 )   PT: 18.8 sec;   INR: 1.60          PTT - ( 04 Nov 2020 08:17 )  PTT:30.2 sec                      RADIOLOGY & ADDITIONAL STUDIES:      POCUS:      PULMONARY CONSULT FOLLOW-UP NOTE    INTERVAL HISTORY:   Reviewed chart and overnight events; patient seen and examined at bedside.   no new complaints    on 2 L NC    no cough   minimal right sided chest pain.     MEDICATIONS:  Pulmonary:    Antimicrobials:    Anticoagulants:  enoxaparin Injectable 40 milliGRAM(s) SubCutaneous every 24 hours    Cardiac:  amLODIPine   Tablet 5 milliGRAM(s) Oral daily  lisinopril 40 milliGRAM(s) Oral daily      Allergies    No Known Allergies    Intolerances        Vital Signs Last 24 Hrs  T(C): 36.4 (04 Nov 2020 06:02), Max: 37.6 (03 Nov 2020 13:52)  T(F): 97.6 (04 Nov 2020 06:02), Max: 99.7 (03 Nov 2020 13:52)  HR: 90 (04 Nov 2020 06:02) (84 - 90)  BP: 122/77 (04 Nov 2020 06:02) (122/77 - 134/75)  BP(mean): --  RR: 19 (04 Nov 2020 06:02) (18 - 19)  SpO2: 94% (04 Nov 2020 06:02) (93% - 94%)        PHYSICAL EXAM:  Constitutional: WDWN  HEENT: NC/AT; PERRL, anicteric sclera; MMM  Neck: supple  Cardiovascular: +S1/S2, RRR  Respiratory: decreased breath sounds right side.   Gastrointestinal: soft, NT/ND; +BSx4  Extremities: WWP; no edema, clubbing or cyanosis  Vascular: 2+ radial, DP/PT pulses B/L  Neurological: AAOx3; no focal deficits    LABS:      CBC Full  -  ( 04 Nov 2020 08:17 )  WBC Count : 12.32 K/uL  RBC Count : 4.39 M/uL  Hemoglobin : 12.7 g/dL  Hematocrit : 38.0 %  Platelet Count - Automated : 441 K/uL  Mean Cell Volume : 86.6 fl  Mean Cell Hemoglobin : 28.9 pg  Mean Cell Hemoglobin Concentration : 33.4 gm/dL  Auto Neutrophil # : 10.07 K/uL  Auto Lymphocyte # : 1.00 K/uL  Auto Monocyte # : 1.12 K/uL  Auto Eosinophil # : 0.02 K/uL  Auto Basophil # : 0.04 K/uL  Auto Neutrophil % : 81.7 %  Auto Lymphocyte % : 8.1 %  Auto Monocyte % : 9.1 %  Auto Eosinophil % : 0.2 %  Auto Basophil % : 0.3 %    11-04    135  |  93<L>  |  18  ----------------------------<  124<H>  3.6   |  28  |  0.94    Ca    9.4      04 Nov 2020 08:17  Phos  3.8     11-04  Mg     2.0     11-04    TPro  6.7  /  Alb  3.2<L>  /  TBili  0.5  /  DBili  x   /  AST  36  /  ALT  21  /  AlkPhos  335<H>  11-04    PT/INR - ( 04 Nov 2020 08:17 )   PT: 18.8 sec;   INR: 1.60          PTT - ( 04 Nov 2020 08:17 )  PTT:30.2 sec                      RADIOLOGY & ADDITIONAL STUDIES:      POCUS: Large pleural effusion confirmed again. safe site identified.

## 2020-11-04 NOTE — PROGRESS NOTE ADULT - PROBLEM SELECTOR PLAN 1
Pt presenting with 10 lbs weight loss, generalized weakness, poor PO intake x 1 month. Pt also endorsing cough. Former smoker. Pt found to have large right pleural effusion of CT likely malignant in the setting of large right upper lobe mass.  Pt currently denies any SOB. Pt desats to 86% when lying flat, now requiring 4 L NC.  - Bedside POCUS revealed mod-large right sided pleural effusion with atelectatic lung findings. No pleural fluid noted on the left.   - Pulmonary team planning for indwelling pleural catheter (pleurx) tomorrow after discussing with family high suspicion of malignant pleural effusion. Would need to remove pleural fluid for diagnostic and therapeutic reason. Discussed with family about option of doing thoracentesis or placing indwelling pleural catheter (PleurX). Given high suspicion of malignant pleural effusion and goals of treatment by family, informed decision to place IPC was taken. Risks and benefits were discussed in detail.      Per team, first thoracentesis may yield 60% sensitivity with 80% and 85% on second and third sendouts, respectively.    - Given patient received Eliquis for afib this morning, thoracentesis likely on Thursday.  - will continue to wean O2 as tolerated Pt presenting with 10 lbs weight loss, generalized weakness, poor PO intake x 1 month. Pt also endorsing cough. Former smoker. Pt found to have large right pleural effusion of CT likely malignant in the setting of large right upper lobe mass.  Pt currently denies any SOB. Pt desats to 86% when lying flat, now requiring 4 L NC.  - Bedside POCUS revealed mod-large right sided pleural effusion with atelectatic lung findings. No pleural fluid noted on the left.   - Pulmonary team planning for indwelling pleural catheter (pleurx) tomorrow after discussing with family high suspicion of malignant pleural effusion. Would need to remove pleural fluid for diagnostic and therapeutic reason. Discussed with family about option of doing thoracentesis or placing indwelling pleural catheter (PleurX). Per pulmonary, first thoracentesis may yield 60% sensitivity with 80% and 85% on second and third sendouts, respectively.    - will continue to wean O2 as tolerated, likely to have improvement after pleurX   - NPO midnight with AM lovenox dose to be held

## 2020-11-04 NOTE — PROGRESS NOTE ADULT - ASSESSMENT
86-year-old male former heavy smoker with a history of throat cancer s/p radiation 7 years ago presenting with  recent weight loss, shortness of breath and found to be hypoxic and CT chest shows a moderate to large right sided pleural effusion with multiple nodular opacity. Pulm consulted for the same and to assess for thoracentesis.           Problem/Recommendation - 1:  Problem: Pleural effusion. Recommendation: Unilateral (Right ) moderate to large, simple looking. Etiology most likely malignancy given history of cancer, smoking history. CT is concerning for malignancy given multiple spiculated nodules, no significant mediastinal lymphadenopathy. He also has multiple hepatic lesions and possible lytic lesion on the right rib. POCUS shows global decrease in LV systolic function but does not seen that he is CHF exacerbation.     - Would need to remove pleural fluid for diagnostic and therapeutic reason. Discussed with family about option of doing thoracentesis or placing indwelling pleural catheter (PleurX). Given high suspicion of malignant pleural effusion and goals of treatment by family, informed decision to place IPC was taken. Risks and benefits were discussed in detail.   - On Eliquis for a-Fib, would need to wait for at least 48 hrs before any minimum invasive procedure.   - Holding Eliquis , on heparin, PleurX scheduled for thursday 11/05 at 9 AM. No anticoagulation on 11/05.   -NPO after midnight night before , please get coagulation panel that AM, EKG, negative COVID with in 72 hrs prior to the scheduled procedure.   - sensitivity of Cytology is 60-65% for one sample, can be up to 80-85 % with 3 different sample at different times. Probably would need tissue to further characterize malignancy if family pursues treatment.     Problem/Recommendation - 2:  ·  Problem: Acute respiratory failure with hypoxia.  Recommendation: Continue with oxygen by nasal canula and titrate to SpO2 >92%.      Problem/Recommendation - 3:  ·  Problem: R/O Malignancy.  Recommendation: As above.

## 2020-11-04 NOTE — DISCHARGE NOTE PROVIDER - PROVIDER TOKENS
PROVIDER:[TOKEN:[[id number]],FOLLOWUP:[2 weeks],ESTABLISHEDPATIENT:[T]],PROVIDER:[TOKEN:[[id number]],SCHEDULEDAPPT:[11/20/2020],SCHEDULEDAPPTTIME:[10:00 AM]]

## 2020-11-04 NOTE — PROGRESS NOTE ADULT - PROBLEM SELECTOR PLAN 4
Pt with 10 lbs weight loss over 1 month. In the setting of poor PO intake and likely malignancy  - per nutrition recs add Ensure Enlive TID and consider appetite stimulant Pt with 10 lbs weight loss over 1 month. In the setting of poor PO intake and likely malignancy  - per nutrition recs add Ensure Enlive TID  - started on dronabinol and remeron per palliative recs

## 2020-11-04 NOTE — PROGRESS NOTE ADULT - PROBLEM SELECTOR PLAN 2
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

## 2020-11-04 NOTE — PROGRESS NOTE ADULT - PROBLEM SELECTOR PLAN 10
F: none  E: replete as needed  N: DASH/TLC  DVT: SCDs in place, holding eliquis for thoracentesis on Thursday    Code status: FULL CODE  Dispo: [facility] F: none  E: replete as needed  N: DASH/TLC -> NPO midnight for PleurX  DVT: SCDs, Lovenox SubQ  Code status: FULL CODE  Dispo: [facility]

## 2020-11-05 PROBLEM — I50.9 HEART FAILURE, UNSPECIFIED: Chronic | Status: ACTIVE | Noted: 2020-01-01

## 2020-11-05 PROBLEM — Z85.819 PERSONAL HISTORY OF MALIGNANT NEOPLASM OF UNSPECIFIED SITE OF LIP, ORAL CAVITY, AND PHARYNX: Chronic | Status: ACTIVE | Noted: 2020-01-01

## 2020-11-05 PROBLEM — E78.5 HYPERLIPIDEMIA, UNSPECIFIED: Chronic | Status: ACTIVE | Noted: 2020-01-01

## 2020-11-05 PROBLEM — I48.91 UNSPECIFIED ATRIAL FIBRILLATION: Chronic | Status: ACTIVE | Noted: 2020-01-01

## 2020-11-05 PROBLEM — I10 ESSENTIAL (PRIMARY) HYPERTENSION: Chronic | Status: ACTIVE | Noted: 2020-01-01

## 2020-11-05 NOTE — PROGRESS NOTE ADULT - SUBJECTIVE AND OBJECTIVE BOX
PULMONARY CONSULT FOLLOW-UP NOTE    INTERVAL HISTORY:   Reviewed chart and overnight events; patient seen and examined at bedside.  No significant events   patient seen before and after PleurX placement.     MEDICATIONS:  Pulmonary:    Antimicrobials:    Anticoagulants:  enoxaparin Injectable 40 milliGRAM(s) SubCutaneous every 24 hours    Cardiac:  amLODIPine   Tablet 5 milliGRAM(s) Oral daily  lisinopril 40 milliGRAM(s) Oral daily      Allergies    No Known Allergies    Intolerances    Vital Signs Last 24 Hrs  T(C): 36.4 (04 Nov 2020 06:02), Max: 37.6 (03 Nov 2020 13:52)  T(F): 97.6 (04 Nov 2020 06:02), Max: 99.7 (03 Nov 2020 13:52)  HR: 90 (04 Nov 2020 06:02) (84 - 90)  BP: 122/77 (04 Nov 2020 06:02) (122/77 - 134/75)  BP(mean): --  RR: 19 (04 Nov 2020 06:02) (18 - 19)  SpO2: 94% (04 Nov 2020 06:02) (93% - 94%)        PHYSICAL EXAM:  Constitutional: WDWN  HEENT: NC/AT; PERRL, anicteric sclera; MMM  Neck: supple  Cardiovascular: +S1/S2, RRR  Respiratory: decreased breath sounds right side, pleurx in situ .  Gastrointestinal: soft, NT/ND; +BSx4  Extremities: WWP; no edema, clubbing or cyanosis  Vascular: 2+ radial, DP/PT pulses B/L  Neurological: AAOx3; no focal deficits    LABS:      CBC Full  -  ( 04 Nov 2020 08:17 )  WBC Count : 12.32 K/uL  RBC Count : 4.39 M/uL  Hemoglobin : 12.7 g/dL  Hematocrit : 38.0 %  Platelet Count - Automated : 441 K/uL  Mean Cell Volume : 86.6 fl  Mean Cell Hemoglobin : 28.9 pg  Mean Cell Hemoglobin Concentration : 33.4 gm/dL  Auto Neutrophil # : 10.07 K/uL  Auto Lymphocyte # : 1.00 K/uL  Auto Monocyte # : 1.12 K/uL  Auto Eosinophil # : 0.02 K/uL  Auto Basophil # : 0.04 K/uL  Auto Neutrophil % : 81.7 %  Auto Lymphocyte % : 8.1 %  Auto Monocyte % : 9.1 %  Auto Eosinophil % : 0.2 %  Auto Basophil % : 0.3 %    11-04    135  |  93<L>  |  18  ----------------------------<  124<H>  3.6   |  28  |  0.94    Ca    9.4      04 Nov 2020 08:17  Phos  3.8     11-04  Mg     2.0     11-04    TPro  6.7  /  Alb  3.2<L>  /  TBili  0.5  /  DBili  x   /  AST  36  /  ALT  21  /  AlkPhos  335<H>  11-04    PT/INR - ( 04 Nov 2020 08:17 )   PT: 18.8 sec;   INR: 1.60          PTT - ( 04 Nov 2020 08:17 )  PTT:30.2 sec

## 2020-11-05 NOTE — PROGRESS NOTE ADULT - PROBLEM SELECTOR PLAN 4
Pt with 10 lbs weight loss over 1 month. In the setting of poor PO intake and likely malignancy  - per nutrition recs add Ensure Enlive TID  - started on dronabinol and remeron per palliative recs Pt with 10 lbs weight loss over 1 month. In the setting of poor PO intake and likely malignancy  - per nutrition recs add Ensure Enlive TID  - c/w dronabinol and remeron per palliative recs  - for bone mets pain control will be sent home on morphine solution and dexamethasone

## 2020-11-05 NOTE — PROGRESS NOTE ADULT - PROBLEM SELECTOR PLAN 3
Pt found to have multiple intrahepatic low-attenuation lesions throughout that may represent metastatic disease and/or primary multifocal hepatocellular neoplasm with evidence of cirrhosis  - serum AFP wnl   - hep panel unremarkable  - will discuss option to biopsy hepatic or pulmonary lesions

## 2020-11-05 NOTE — PROGRESS NOTE ADULT - SUBJECTIVE AND OBJECTIVE BOX
INTERVAL HPI/OVERNIGHT EVENTS:  As per night team, no overnight events. Patient underwent PleurX placement via pulmonology team today, awaiting final recs. Patient seen and examined at bedside. Patient denies fever, chills, HA, CP, SOB, N/V/D/C, changes in bowel movements, LE swelling.     VITALS  Vital Signs Last 24 Hrs  T(C): 36.9 (05 Nov 2020 13:27), Max: 36.9 (05 Nov 2020 13:27)  T(F): 98.5 (05 Nov 2020 13:27), Max: 98.5 (05 Nov 2020 13:27)  HR: 87 (05 Nov 2020 13:27) (86 - 89)  BP: 119/71 (05 Nov 2020 13:27) (119/71 - 132/67)  BP(mean): --  RR: 17 (05 Nov 2020 13:27) (17 - 18)  SpO2: 92% (05 Nov 2020 13:27) (92% - 93%)    CAPILLARY BLOOD GLUCOSE          PHYSICAL EXAM  General: WDWN, NAD, sitting comfortably in bed   HEENT: PERRL/ EOMI, no scleral icterus, MMM  Neck: Supple; no JVD  Respiratory: reduced right sided breath sounds, no wheezing, no accessory muscle use  Cardiovascular: Regular rhythm/rate; +S1 +S2  Gastrointestinal: Soft, NTND, normoactive BS, no rebound, no guarding, no suprapubic tenderness  Extremities: WWP, no cyanosis, no clubbing, no edema, pulses equal  Neurological: A&Ox3, CNII-XII grossly intact, no gross focal deficits, follows commands  Skin: Normal temperature, warm, dry    MEDICATIONS  (STANDING):  amLODIPine   Tablet 5 milliGRAM(s) Oral daily  dronabinol 2.5 milliGRAM(s) Oral every 12 hours  furosemide    Tablet 40 milliGRAM(s) Oral daily  gemfibrozil 600 milliGRAM(s) Oral two times a day  lisinopril 40 milliGRAM(s) Oral daily  mirtazapine 7.5 milliGRAM(s) Oral every 24 hours  polyethylene glycol 3350 17 Gram(s) Oral daily  senna 2 Tablet(s) Oral at bedtime  simvastatin 40 milliGRAM(s) Oral at bedtime    MEDICATIONS  (PRN):      No Known Allergies      LABS                        12.7   13.48 )-----------( 451      ( 05 Nov 2020 07:32 )             38.1     11-05    137  |  94<L>  |  22  ----------------------------<  111<H>  3.4<L>   |  26  |  0.85    Ca    9.2      05 Nov 2020 07:32  Phos  3.5     11-05  Mg     1.8     11-05    TPro  6.9  /  Alb  3.2<L>  /  TBili  0.5  /  DBili  x   /  AST  34  /  ALT  22  /  AlkPhos  369<H>  11-05    PT/INR - ( 05 Nov 2020 07:32 )   PT: 17.4 sec;   INR: 1.48          PTT - ( 05 Nov 2020 07:32 )  PTT:28.7 sec          RADIOLOGY & ADDITIONAL TESTS: Reviewed

## 2020-11-05 NOTE — PROCEDURE NOTE - ADDITIONAL PROCEDURE DETAILS
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 Position confirmed on CXR. PleurX care was taught to family for care of IPC. Patient will have IPC drained by nurse every 2 days and will follow up with Dr. Ervin Mason in 3 weeks.

## 2020-11-05 NOTE — PROGRESS NOTE ADULT - PROBLEM SELECTOR PLAN 7
-pt and family appreciative and will continue to benefit from palliative care support and symptom in setting of metastatic cancer diagnosis with poor prognosis and decision to transition to home hospice care   -pt accepted to [facility]. Referral place. Palliative SW actively involved to coordinate   -Support provided to patient and family. Patient to have access to supportive services during rest of hospital stay as the patient/family deemed necessary ie. Chaplaincy, Massage therapy, Music therapy, Patient and family supportive services, Palliative SW, etc.

## 2020-11-05 NOTE — PROGRESS NOTE ADULT - PROBLEM SELECTOR PLAN 6
At least moderate MR on echo, Hard to quantitate due to multiple jets. Elevated BNP. No afib noted. Continue plan for op fuv with added diuretic and loop.      Future Appointments  Date Time Provider Luis Angel Borden   5/23/2018 10:00 AM [address] [place] [address] CT ER 3 [facility]   5/31/2018 10:30 [facility]   8/20/2018 9:00 AM Mila Kirby MD [address] [place] [address],[facility], [id number] Patient on eliquis 5 mg BID for afib  - was previously evaluated by Dr. Huber in 3/2020 for persistent AF. Consideration for PPM vs ICD at that time.  - holding eliquis for now Patient on eliquis 5 mg BID for afib  - was previously evaluated by Dr. Huber in 3/2020 for persistent AF. Consideration for PPM vs ICD at that time.  - resume after pleurX

## 2020-11-05 NOTE — PROGRESS NOTE ADULT - PROBLEM SELECTOR PLAN 1
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 on 11/2 ctap multiple lucent lesions including pathologic fracture involving right fifth rib  -dex 4 mg PO [id number] given concern for bone mets --will also help with lethargy and somatic pain   -continue Remeron 7.5 mg PO qD progressive lung cancer mets to liver, bone. on 11/2 ctap multiple lucent lesions including pathologic fracture involving right fifth rib  -dex 4 mg PO [id number] iso likely bone mets --will also help with lethargy and somatic pain   -continue Remeron 7.5 mg PO qD  -ok to continue marinol 2.5 mg PO BID

## 2020-11-05 NOTE — PROGRESS NOTE ADULT - PROBLEM SELECTOR PLAN 2
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 GOC discussed today, see note below. DC home with hospice and transition to with focus on comfort  -appreciate primary team, pulm team management  -sp pleurx drain placement today for symptom support   -dc home today with home hospice and transition to care with focus on comfort referred nociceptive somatic pain from right rib fracture   -will dc with MS liquid 5 mg SL q6h PRN pain. [facility]  -dex as above

## 2020-11-05 NOTE — PROGRESS NOTE ADULT - PROBLEM SELECTOR PLAN 5
-pt and family appreciative and will continue to benefit from palliative care support and symptom in setting of metastatic cancer diagnosis with poor prognosis  -Support provided to patient and family. Patient to have access to supportive services during rest of hospital stay as the patient/family deemed necessary ie. Chaplaincy, Massage therapy, Music therapy, Patient and family supportive services, Palliative SW, etc. In addition to the EM visit, an advance care planning meeting was performed  Start time: 10:30a  End time: 11:15a  Total time: 45 min  A face to face meeting to discuss advance care planning was held today regarding: RAPHAEL NAIK  Primary decision maker: patient  Alternate/surrogate: wife Idalia, daughter Bessy  Discussed advance directives including, but not limited to, healthcare proxy and code status.  Decision regarding code status: DNR/DNI  Documentation completed today: MOLST completed, placed in physical chart    Pt's PMHx and chart thoroughly reviewed. Discussion with pt, pts wife, daughter, Dr. Pinto and this palliative NP. Medical team expressed their concern that pursuing chemotherapy in setting of pt's functional status, symptom burden, and age would exacerbate suffering. Family expressed understanding that pt has metastatic stage IV cancer that cannot be cured as well as their wish to "keep patient comfortable". Family and patient in alignment with transition to care with focus on symptom management and quality of life. We discussed home hospice, where the goal is to keep patient home and comfortable rather than in the hospital.     Code status discussed with patient and family. In setting of previous wishes, pt in agreement with DNR/DNI order. MOLST completed and placed in physical chart.     Plan:  -pt accepted to [facility]  -arranging DC either today or tomorrow

## 2020-11-05 NOTE — PROGRESS NOTE ADULT - PROBLEM SELECTOR PLAN 4
-see GOC above  -communication with DINA Méndez from [facility] regarding today's code status discussion. 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 GOC discussed today, see note below. DC home with hospice and transition to with focus on comfort  -appreciate primary team, pulm team management  -sp pleurx drain placement today for symptom control   -dc with home hospice and transition to care with focus on comfort

## 2020-11-05 NOTE — PROGRESS NOTE ADULT - ASSESSMENT
86-year-old male former heavy smoker with a history of throat cancer s/p radiation 7 years ago presenting with  recent weight loss, shortness of breath and found to be hypoxic and CT chest shows a moderate to large right sided pleural effusion with multiple nodular opacity. Pulm consulted for the same and to assess for thoracentesis.      Problem/Recommendation - 1:  Problem: Pleural effusion. Recommendation: Unilateral (Right ) moderate to large, simple looking. Etiology most likely malignancy given history of cancer, smoking history. CT is concerning for malignancy given multiple spiculated nodules, no significant mediastinal lymphadenopathy. He also has multiple hepatic lesions and possible lytic lesion on the right rib. POCUS shows global decrease in LV systolic function but does not seen that he is CHF exacerbation.     - PleurX placed.   - Can transition to oral AC.   - Will follow cytology.   - Instructed to drain Pleurx every other day as long as out put in > 250 ml every day, can decrease the frequency to every 2 day if output is less and patient is SOB.  - Nurse should teach IPC care to patient and family and set up Home visiting nurse before dc.   - Paper work for PleurX supply given to .   - follow up with Dr Ervin Mason on 4 th floor 4E in 2-3 weeks time.        Problem/Recommendation - 2:  ·  Problem: Acute respiratory failure with hypoxia.  Recommendation: Continue with oxygen by nasal canula and titrate to SpO2 >92%.      Problem/Recommendation - 3:  ·  Problem: R/O Malignancy.  Recommendation: As above.

## 2020-11-05 NOTE — PROGRESS NOTE ADULT - ASSESSMENT
86M former long time smoker, with a history of throat cancer (S/P radiation therapy 7 years ago), HTN, dyslipidemia, afib on eliquis, HFrEF (3/2020 echo LVEF 30% w/ global hypokinesis) presenting with 1 month of weightloss and shortness of breath, found to have likely metastatic lung ca with large R pleural effusion. Palliative Care consulted to assist with GOC and supportive care in setting of metastatic cancer diagnosis. 86M former long time smoker, with a history of throat cancer (S/P radiation therapy 7 years ago), HTN, dyslipidemia, afib on eliquis, HFrEF (3/2020 echo LVEF 30% w/ global hypokinesis) presenting with 1 month of weightloss and shortness of breath, found to have likely metastatic lung ca with large R pleural effusion. Palliative Care consulted to assist with GOC and supportive care in setting of metastatic cancer diagnosis. GOC discussed today, dc home with home hospice.

## 2020-11-05 NOTE — PROGRESS NOTE ADULT - SUBJECTIVE AND OBJECTIVE BOX
CC: generalized weakness poor appetite    SUBJECTIVE: weak, ill appearing man sitting up in bed, pale. no acute distress. wakes to name.     goc discussed with Bessy Friedman Dr. Bhagat, palliative team. see notes below    ROS:  DYSPNEA: Y 1 at rest with supplemental o2 via NC, 4 with exertion walking to bathroom 	  NAUS/VOM: N	  SECRETIONS: N	  AGITATION: N  Pain (Y/N):     right shoulder  -Provocation: coughing, deep breathing  -Palliation: no relief at present  -Quality/Quantity: ache  -Radiating: right flank   -Severity: moderate to severe  -Timing/Frequency: intermittent   -Impact on ADLs: generalized deconditioning due to underlying medical condition     OTHER REVIEW OF SYSTEMS: negative unless specified above.    PEx:  T(C): 36.9 (11-05-20 @ 13:27), Max: 36.9 (11-05-20 @ 13:27)  HR: 87 (11-05-20 @ 13:27) (86 - 89)  BP: 119/71 (11-05-20 @ 13:27) (119/71 - 132/67)  RR: 17 (11-05-20 @ 13:27) (17 - 18)  SpO2: 92% (11-05-20 @ 13:27) (92% - 93%)  Wt(kg): --          General: WDWN, NAD, sitting comfortably in bed on 4L NC  HEENT: PERRL/ EOMI, no scleral icterus, MMM  Neck: Supple; no JVD  Respiratory: reduced right basilar breath sounds, crackles, no accessory muscle use  Cardiovascular: Regular rhythm/rate; +S1 +S2  Gastrointestinal: Soft, NTND, normoactive BS, no rebound, no guarding, no suprapubic tenderness  Extremities: WWP, +L varicose veins, no cyanosis, no clubbing, no edema, pulses equal  Neurological: A&Ox3, no gross focal deficits, follows commands  Skin: ashen pale temperature, warm, dry		     ALLERGIES: No Known Allergies      OPIATE NAÏVE (Y/N): Y    MEDICATIONS: REVIEWED  MEDICATIONS  (STANDING):  amLODIPine   Tablet 5 milliGRAM(s) Oral daily  dronabinol 2.5 milliGRAM(s) Oral every 12 hours  furosemide    Tablet 40 milliGRAM(s) Oral daily  gemfibrozil 600 milliGRAM(s) Oral two times a day  lisinopril 40 milliGRAM(s) Oral daily  mirtazapine 7.5 milliGRAM(s) Oral every 24 hours  polyethylene glycol 3350 17 Gram(s) Oral daily  senna 2 Tablet(s) Oral at bedtime  simvastatin 40 milliGRAM(s) Oral at bedtime    MEDICATIONS  (PRN):    LABS: REVIEWED  CBC:                        12.7   13.48 )-----------( 451      ( 05 Nov 2020 07:32 )             38.1     CMP:    11-05    137  |  94<L>  |  22  ----------------------------<  111<H>  3.4<L>   |  26  |  0.85    Ca    9.2      05 Nov 2020 07:32  Phos  3.5     11-05  Mg     1.8     11-05    TPro  6.9  /  Alb  3.2<L>  /  TBili  0.5  /  DBili  x   /  AST  34  /  ALT  22  /  AlkPhos  369<H>  11-05  Albumin, Serum: 3.2 g/dL (11-05-20 @ 07:32)      IMAGING: REVIEWED    ADVANCED DIRECTIVES:             DNR DNI     GOC discussed, see note below       MOLST completed and placed in physical chart 11/5, copy provided to family       LIVING WILL, DPOA, HCP form  not on file in [facility].    Decision maker: The patient is able to participate in complex medical decision making conversations.   Legal surrogate: wife, Idalia Watters    What is most important to patient: "Bright and joey days ahead." "Seeing and spending time with grandchildren"  What worries patient the most: "I try not to think too much about my worries... I guess cloudy & rainy days"   Is patient at peace?: yes    GOALS OF CARE DISCUSSION       Palliative care info/counseling provided	           Advanced Directives addressed, see note below  	           Agency discussed: home hospice, [place]        Documentation of GOC: none this visit    PSYCHOSOCIAL-SPIRITUAL ASSESSMENT: Reviewed, Care plan unchanged CC: generalized weakness poor appetite    SUBJECTIVE: weak, ill appearing man sitting up in bed, pale. no acute distress. wakes to name.     goc discussed with Bessy Friedman Dr. Bhagat, t. see note below    ROS:  DYSPNEA: Y 1 at rest with supplemental o2 via NC, 4 with exertion walking to bathroom 	  NAUS/VOM: N	  SECRETIONS: N	  AGITATION: N  Pain (Y/N):     right shoulder  -Provocation: coughing, deep breathing  -Palliation: no relief at present  -Quality/Quantity: ache  -Radiating: right flank   -Severity: moderate to severe  -Timing/Frequency: intermittent   -Impact on ADLs: generalized deconditioning due to underlying medical condition     OTHER REVIEW OF SYSTEMS: negative unless specified above.    PEx:  T(C): 36.9 (11-05-20 @ 13:27), Max: 36.9 (11-05-20 @ 13:27)  HR: 87 (11-05-20 @ 13:27) (86 - 89)  BP: 119/71 (11-05-20 @ 13:27) (119/71 - 132/67)  RR: 17 (11-05-20 @ 13:27) (17 - 18)  SpO2: 92% (11-05-20 @ 13:27) (92% - 93%)  Wt(kg): --          General: WDWN, NAD, sitting comfortably in bed on 4L NC  HEENT: PERRL/ EOMI, no scleral icterus, MMM  Neck: Supple; no JVD  Respiratory: reduced right basilar breath sounds, crackles, no accessory muscle use  Cardiovascular: Regular rhythm/rate; +S1 +S2  Gastrointestinal: Soft, NTND, normoactive BS, no rebound, no guarding, no suprapubic tenderness  Extremities: WWP, +L varicose veins, no cyanosis, no clubbing, no edema, pulses equal  Neurological: A&Ox3, no gross focal deficits, follows commands  Skin: ashen pale temperature, warm, dry		     ALLERGIES: No Known Allergies      OPIATE NAÏVE (Y/N): Y    MEDICATIONS: REVIEWED  MEDICATIONS  (STANDING):  amLODIPine   Tablet 5 milliGRAM(s) Oral daily  dronabinol 2.5 milliGRAM(s) Oral every 12 hours  furosemide    Tablet 40 milliGRAM(s) Oral daily  gemfibrozil 600 milliGRAM(s) Oral two times a day  lisinopril 40 milliGRAM(s) Oral daily  mirtazapine 7.5 milliGRAM(s) Oral every 24 hours  polyethylene glycol 3350 17 Gram(s) Oral daily  senna 2 Tablet(s) Oral at bedtime  simvastatin 40 milliGRAM(s) Oral at bedtime    MEDICATIONS  (PRN):    LABS: REVIEWED  CBC:                        12.7   13.48 )-----------( 451      ( 05 Nov 2020 07:32 )             38.1     CMP:    11-05    137  |  94<L>  |  22  ----------------------------<  111<H>  3.4<L>   |  26  |  0.85    Ca    9.2      05 Nov 2020 07:32  Phos  3.5     11-05  Mg     1.8     11-05    TPro  6.9  /  Alb  3.2<L>  /  TBili  0.5  /  DBili  x   /  AST  34  /  ALT  22  /  AlkPhos  369<H>  11-05  Albumin, Serum: 3.2 g/dL (11-05-20 @ 07:32)      IMAGING: REVIEWED    ADVANCED DIRECTIVES:             DNR DNI     GOC discussed, see note below       MOLST completed and placed in physical chart 11/5, copy provided to family       LIVING WILL, DPOA, HCP form  not on file in [facility].    Decision maker: The patient is able to participate in complex medical decision making conversations.   Legal surrogate: wife, Idalia Watters    What is most important to patient: "Bright and joey days ahead." "Seeing and spending time with grandchildren"  What worries patient the most: "I try not to think too much about my worries... I guess cloudy & rainy days"   Is patient at peace?: yes    GOALS OF CARE DISCUSSION       Palliative care info/counseling provided	           Advanced Directives addressed, see note below  	           Agency discussed: home hospice, [place]        Documentation of GOC: none this visit    PSYCHOSOCIAL-SPIRITUAL ASSESSMENT: Reviewed, Care plan unchanged

## 2020-11-05 NOTE — PROGRESS NOTE ADULT - PROBLEM SELECTOR PLAN 1
Pt presenting with 10 lbs weight loss, generalized weakness, poor PO intake x 1 month. Pt also endorsing cough. Former smoker. Pt found to have large right pleural effusion of CT likely malignant in the setting of large right upper lobe mass.  Pt currently denies any SOB. Pt desats to 86% when lying flat, now requiring 4 L NC.  - Bedside POCUS revealed mod-large right sided pleural effusion with atelectatic lung findings. No pleural fluid noted on the left.   - Pulmonary team placed indwelling pleural catheter (pleurx) today for diagnostic and therapeutic reasons. Discussed with family about option of doing thoracentesis or placing indwelling pleural catheter (PleurX). Per pulmonary, first thoracentesis may yield 60% sensitivity with 80% and 85% on second and third sendouts, respectively.    - will continue to wean O2 as tolerated, likely to have improvement after pleurX   - NPO midnight with AM lovenox dose to be held Pt presenting with 10 lbs weight loss, generalized weakness, poor PO intake x 1 month. Pt also endorsing cough. Former smoker. Pt found to have large right pleural effusion of CT likely malignant in the setting of large right upper lobe mass.  Pt currently denies any SOB. Pt desats to 86% when lying flat, now requiring 4 L NC.  - Bedside POCUS revealed mod-large right sided pleural effusion with atelectatic lung findings. No pleural fluid noted on the left.   - Pulmonary team placed indwelling pleural catheter (pleurx) today for diagnostic and therapeutic reasons. Discussed with family about option of doing thoracentesis or placing indwelling pleural catheter (PleurX). Per pulmonary, first thoracentesis may yield 60% sensitivity with 80% and 85% on second and third sendouts, respectively.    - will continue to wean O2 as tolerated, likely to have improvement after pleurX   - will resume AC after procedure

## 2020-11-05 NOTE — PROGRESS NOTE ADULT - PROBLEM SELECTOR PLAN 10
F: none  E: replete as needed  N: DASH/TLC -> NPO midnight for PleurX  DVT: SCDs, Lovenox SubQ  Code status: FULL CODE  Dispo: [facility] F: none  E: replete as needed  N: DASH/TLC   DVT: Eliquis after PleurX  Code status: FULL CODE  Dispo: F

## 2020-11-05 NOTE — PROGRESS NOTE ADULT - PROBLEM SELECTOR PLAN 3
-lung cancer with pleural effusions. improved sp pleurx today  -ms PRN as above. [facility]  -continue supplemental oxygen

## 2020-11-05 NOTE — PROGRESS NOTE ADULT - SUBJECTIVE AND OBJECTIVE BOX
Patient was seen and examined by me at bedside. I agree with resident's note, subjective, objective physical exam, assessment and plan with following modifications/additions.    Greater than 35 minutes spent on total encounter; more than 50% of the visit was spent counseling and/or coordinating care by the attending physician.    86M with history of throat cancer (S/P radiation therapy 7 years ago), HTN, dyslipidemia, afib on eliquis, HFrEF (3/2020 echo LVEF 30% w/ global hypokinesis), likely new metastatic presumed lung cancer with R effusion, s/p pleurex this am. I personally had goals of care discussion with family and patient and palliative care team today- patient and family not wanting chemo/aggressive intervention, focus on comfort with home hospice and likely moving toward DNR/DNI also.   -Goal to have patient home ideally today with home 02, pleurex drainage at home (will have nursing teach family today), hospice will f/u patient.  -Pain control as per palliative, will order opioids for home for radiating R chest/shoulder pain from malignancy  -Steroids for bone disease  -Appetite simultant ordered  -Continue home CHF and afib meds. Will clarify with pulm when to restart eliquis post procedure, likely can be today.     Mele Pinto MD [phone]

## 2020-11-05 NOTE — PROGRESS NOTE ADULT - PROBLEM SELECTOR PLAN 8
- c/w lisinopril 40 mg QD  - c/w amlodipine 5 mg QD  - holding home lasix as patient euvolemic on exam and was found to be hyponatremic on admission - c/w lisinopril 40 mg QD  - c/w amlodipine 5 mg QD

## 2020-11-06 NOTE — DISCHARGE NOTE NURSING/CASE MANAGEMENT/SOCIAL WORK - NSDCPEELIQUISREACT_GEN_ALL_CORE
Apixaban/Eliquis increases your risk for bleeding. Notify your doctor if you experience any of the following side effects: bleeding, coughing or vomiting blood, red or black stool, unexpected pain or swelling, itching or hives, chest pain, chest tightness, trouble breathing, changes in how much or how often you urinate, red or pink urine, numbness or tingling in your feet, or unusual muscle weakness. When Apixaban/Eliquis is taken with other medicines, they can affect how it works. Taking other medications such as aspirin, blood thinners, nonsteroidal anti-inflammatories, and medications that treat depression can increase your risk of bleeding. It is very important to tell your health care provider about all of the other medicines, including over-the-counter medications, herbs, and vitamins you are taking. DO NOT start, stop, or change the dosage of any medicine, including over-the-counter medicines, vitamins, and herbal products without your doctor’s approval. Any products containing aspirin or are nonsteroidal anti-inflammatories lessen the blood’s ability to form clots and add to the effect of Apixaban/Eliquis. Never take aspirin or medicines that contain aspirin without speaking to your doctor.
details…

## 2020-11-06 NOTE — PROGRESS NOTE ADULT - PROBLEM SELECTOR PLAN 7
-pt dc with home hospice today  -education on morphine PRN at bedside, psychosocial support provided   Palliative medicine will sign off for now. Please reconsult if the patients symptoms change and need to be reassessed, the patients goals of care need to be readdressed based on clinical changes, or if patient is readmitted in the future.

## 2020-11-06 NOTE — PROGRESS NOTE ADULT - ASSESSMENT
86M former long time smoker, with a history of throat cancer (S/P radiation therapy 7 years ago), HTN, dyslipidemia, afib on eliquis, HFrEF (3/2020 echo LVEF 30% w/ global hypokinesis) presenting with 1 month of weightloss and shortness of breath, found to have likely metastatic lung ca with large R pleural effusion. Palliative Care consulted to assist with GOC and supportive care in setting of metastatic cancer diagnosis. GOC discussed today, dc home with home hospice today.

## 2020-11-06 NOTE — PROGRESS NOTE ADULT - ASSESSMENT
86-year-old male former heavy smoker with a history of throat cancer s/p radiation 7 years ago presenting with  recent weight loss, shortness of breath and found to be hypoxic and CT chest shows a moderate to large right sided pleural effusion with multiple nodular opacity. Pulm consulted for the same      Problem/Recommendation - 1:  Problem: Pleural effusion. Recommendation: Unilateral (Right ) moderate to large, simple looking. Etiology most likely malignancy given history of cancer, smoking history. CT is concerning for malignancy given multiple spiculated nodules, no significant mediastinal lymphadenopathy. He also has multiple hepatic lesions and possible lytic lesion on the right rib. POCUS shows global decrease in LV systolic function but does not seen that he is CHF exacerbation.     - PleurX placed.   - cytology pending.   - Instructed to drain Pleurx every other day as long as out put in > 250 ml every day, can decrease the frequency to every 2 day if output is less and patient is SOB.  - Nurse should teach IPC care to patient and family and set up Home visiting nurse before dc.   - Paper work for PleurX supply given to .   - follow up with Dr Ervin Mason on 4 th floor 4E in 2-3 weeks time.        Problem/Recommendation - 2:  ·  Problem: Acute respiratory failure with hypoxia.  Recommendation: Continue with oxygen by nasal canula and titrate to SpO2 >92%.      Problem/Recommendation - 3:  ·  Problem: R/O Malignancy.  Recommendation: As above.

## 2020-11-06 NOTE — PROGRESS NOTE ADULT - SUBJECTIVE AND OBJECTIVE BOX
CC: weight loss, generalized weakness    SUBJECTIVE: weak appearing man sitting up in bed, pale. no acute distress. appetite fair. looking forward to going home today.     ROS:  DYSPNEA: 0, off supplemental oxygen  NAUS/VOM: N	  SECRETIONS: N	  AGITATION: N  Pain (Y/N):     right shoulder  -Provocation: coughing, deep breathing  -Palliation: no relief at present  -Quality/Quantity: ache  -Radiating: right flank   -Severity: moderate to severe  -Timing/Frequency: intermittent   -Impact on ADLs: generalized deconditioning due to underlying medical condition     OTHER REVIEW OF SYSTEMS: negative unless specified above.    PEx:  T(C): 36.6 (11-06-20 @ 05:25), Max: 36.9 (11-05-20 @ 13:27)  HR: 94 (11-06-20 @ 05:25) (85 - 94)  BP: 111/73 (11-06-20 @ 05:25) (111/73 - 131/61)  RR: 17 (11-06-20 @ 05:25) (17 - 18)  SpO2: 91% (11-06-20 @ 05:25) (91% - 98%)  Wt(kg): --     General: WDWN, NAD, sitting comfortably in bed on 4L NC  HEENT: PERRL/ EOMI, no scleral icterus, MMM  Neck: Supple; no JVD  Respiratory: reduced right basilar breath sounds, crackles, no accessory muscle use  Cardiovascular: Regular rhythm/rate; +S1 +S2  Gastrointestinal: Soft, NTND, normoactive BS, no rebound, no guarding, no suprapubic tenderness  Extremities: WWP, +L varicose veins, no cyanosis, no clubbing, no edema, pulses equal  Neurological: A&Ox3, no gross focal deficits, follows commands  Skin: ashen pale temperature, warm, dry		     ALLERGIES: No Known Allergies      OPIATE NAÏVE (Y/N): Y    MEDICATIONS: REVIEWED  MEDICATIONS  (STANDING):  amLODIPine   Tablet 5 milliGRAM(s) Oral daily  apixaban 5 milliGRAM(s) Oral every 12 hours  dronabinol 2.5 milliGRAM(s) Oral every 12 hours  furosemide    Tablet 40 milliGRAM(s) Oral daily  lisinopril 40 milliGRAM(s) Oral daily  mirtazapine 7.5 milliGRAM(s) Oral every 24 hours  polyethylene glycol 3350 17 Gram(s) Oral daily  senna 2 Tablet(s) Oral at bedtime  simvastatin 40 milliGRAM(s) Oral at bedtime    MEDICATIONS  (PRN):    LABS: REVIEWED  CBC:                        12.7   13.48 )-----------( 451      ( 05 Nov 2020 07:32 )             38.1     CMP:    11-05    137  |  94<L>  |  22  ----------------------------<  111<H>  3.4<L>   |  26  |  0.85    Ca    9.2      05 Nov 2020 07:32  Phos  3.5     11-05  Mg     1.8     11-05    TPro  6.9  /  Alb  3.2<L>  /  TBili  0.5  /  DBili  x   /  AST  34  /  ALT  22  /  AlkPhos  369<H>  11-05  Albumin, Serum: 3.2 g/dL (11-05-20 @ 07:32)      IMAGING: REVIEWED    ADVANCED DIRECTIVES:             DNR DNI     GOC discussed 11/5       MARCO completed and placed in physical chart 11/5, copy provided to family       LIVING WILL, DPOA, HCP form  not on file in [facility].    Decision maker: The patient is able to participate in complex medical decision making conversations.   Legal surrogate: wife, Idalia Watters    What is most important to patient: "Bright and joey days ahead." "Seeing and spending time with grandchildren"  What worries patient the most: "I try not to think too much about my worries... I guess cloudy & rainy days"   Is patient at peace?: yes    GOALS OF CARE DISCUSSION       Palliative care info/counseling provided	           Advanced Directives addressed 11/5, MARCO DNR DNI	           [facility]        Documentation of GOC: none this visit    PSYCHOSOCIAL-SPIRITUAL ASSESSMENT: Reviewed, Care plan unchanged

## 2020-11-06 NOTE — PROGRESS NOTE ADULT - NUTRITIONAL ASSESSMENT
This patient has been assessed with a concern for Malnutrition and has been determined to have a diagnosis/diagnoses of Mild protein-calorie malnutrition.    This patient is being managed with:   Diet DASH/TLC-  Sodium & Cholesterol Restricted  Supplement Feeding Modality:  Oral  Ensure Enlive Cans or Servings Per Day:  2       Frequency:  Daily  Entered: Nov  3 2020 12:25PM    
This patient has been assessed with a concern for Malnutrition and has been determined to have a diagnosis/diagnoses of Mild protein-calorie malnutrition.    This patient is being managed with:   Diet DASH/TLC-  Sodium & Cholesterol Restricted  Supplement Feeding Modality:  Oral  Ensure Enlive Cans or Servings Per Day:  2       Frequency:  Daily  Entered: Nov  3 2020 12:25PM    
This patient has been assessed with a concern for Malnutrition and has been determined to have a diagnosis/diagnoses of Unspecified protein-calorie malnutrition.    This patient is being managed with:   Diet NPO after Midnight-     NPO Start Date: 04-Nov-2020   NPO Start Time: 23:59  Except Medications  Entered: Nov 4 2020  6:12AM    Diet DASH/TLC-  Sodium & Cholesterol Restricted  Supplement Feeding Modality:  Oral  Ensure Enlive Cans or Servings Per Day:  2       Frequency:  Daily  Entered: Nov  3 2020 12:25PM    
This patient has been assessed with a concern for Malnutrition and has been determined to have a diagnosis/diagnoses of Unspecified protein-calorie malnutrition.    This patient is being managed with:   Diet NPO after Midnight-     NPO Start Date: 04-Nov-2020   NPO Start Time: 23:59  Except Medications  Entered: Nov 4 2020  6:12AM    Diet DASH/TLC-  Sodium & Cholesterol Restricted  Supplement Feeding Modality:  Oral  Ensure Enlive Cans or Servings Per Day:  2       Frequency:  Daily  Entered: Nov  3 2020 12:25PM    
This patient has been assessed with a concern for Malnutrition and has been determined to have a diagnosis/diagnoses of Mild protein-calorie malnutrition.    This patient is being managed with:   Diet DASH/TLC-  Sodium & Cholesterol Restricted  Supplement Feeding Modality:  Oral  Ensure Enlive Cans or Servings Per Day:  2       Frequency:  Daily  Entered: Nov  3 2020 12:25PM    
This patient has been assessed with a concern for Malnutrition and has been determined to have a diagnosis/diagnoses of Unspecified protein-calorie malnutrition.    This patient is being managed with:   Diet NPO after Midnight-     NPO Start Date: 04-Nov-2020   NPO Start Time: 23:59  Except Medications  Entered: Nov 4 2020  6:12AM    Diet DASH/TLC-  Sodium & Cholesterol Restricted  Supplement Feeding Modality:  Oral  Ensure Enlive Cans or Servings Per Day:  2       Frequency:  Daily  Entered: Nov  3 2020 12:25PM    
This patient has been assessed with a concern for Malnutrition and has been determined to have a diagnosis/diagnoses of Mild protein-calorie malnutrition.    This patient is being managed with:   Diet DASH/TLC-  Sodium & Cholesterol Restricted  Supplement Feeding Modality:  Oral  Ensure Enlive Cans or Servings Per Day:  2       Frequency:  Daily  Entered: Nov  3 2020 12:25PM    
This patient has been assessed with a concern for Malnutrition and has been determined to have a diagnosis/diagnoses of Mild protein-calorie malnutrition.    This patient is being managed with:   Diet DASH/TLC-  Sodium & Cholesterol Restricted  Supplement Feeding Modality:  Oral  Ensure Enlive Cans or Servings Per Day:  2       Frequency:  Daily  Entered: Nov  3 2020 12:25PM

## 2020-11-06 NOTE — DISCHARGE NOTE NURSING/CASE MANAGEMENT/SOCIAL WORK - NSDCFUADDAPPT_GEN_ALL_CORE_FT
1. No appointments are scheduled as you have elected for home care with home hospice.   2. The pulmonary team under Dr. Ervin Mason should call you to discuss the diagnostic studies from your pleural fluid.

## 2020-11-06 NOTE — DISCHARGE NOTE NURSING/CASE MANAGEMENT/SOCIAL WORK - PATIENT PORTAL LINK FT
You can access the FollowMyHealth Patient Portal offered by [facility] by registering at the following website: [email or web address]. By joining [facility]’s FollowMyHealth portal, you will also be able to view your health information using other applications (apps) compatible with our system.

## 2020-11-06 NOTE — PROGRESS NOTE ADULT - PROBLEM SELECTOR PLAN 2
referred nociceptive somatic pain from right rib fracture   -will dc with MS liquid 5 mg SL q6h PRN pain. [facility]  -dex as above

## 2020-11-06 NOTE — PROGRESS NOTE ADULT - PROBLEM SELECTOR PLAN 4
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 GOC discussed 11/5 see note below.   -DC home with hospice and transition to with focus on comfort  -appreciate primary team, pulm team management  -sp pleurx drain placement 11/5 for symptom control

## 2020-11-06 NOTE — PROGRESS NOTE ADULT - SUBJECTIVE AND OBJECTIVE BOX
Patient was seen and examined by me at bedside. I agree with resident's note, subjective, objective physical exam, assessment and plan with following modifications/additions.    Greater than 35 minutes spent on total encounter; more than 50% of the visit was spent counseling and/or coordinating care by the attending physician.    86M with history of throat cancer (S/P radiation therapy 7 years ago), HTN, dyslipidemia, afib on eliquis, HFrEF (3/2020 echo LVEF 30% w/ global hypokinesis), likely new metastatic presumed lung cancer with R effusion, s/p pleurex, made hospice care, plan for home with home hospice today  -home 02 setup, fam taught re pluerex drainage  -Pain control as per palliative, will order opioids for home for radiating R chest/shoulder pain from malignancy  -Steroids for bone disease  -Appetite simultant ordered  -Continue home CHF and afib meds. Can restart eliquis on dc   -Dc home with home hospice today

## 2020-11-06 NOTE — PROGRESS NOTE ADULT - PROBLEM SELECTOR PLAN 3
-lung cancer with pleural effusions. improved sp pleurx 11/5  -ms PRN as above. Rx [facility]  -continue supplemental oxygen PRN  -will have home oxygen at home with hospice

## 2020-11-06 NOTE — PROGRESS NOTE ADULT - SUBJECTIVE AND OBJECTIVE BOX
PULMONARY CONSULT FOLLOW-UP NOTE    INTERVAL HISTORY:   Reviewed chart and overnight events; patient seen and examined at bedside.  c/o right shoulder pain with right arm movement.   No leakage at the site of pleurX.   Spo2 96% on RA.    MEDICATIONS:  Pulmonary:    Antimicrobials:    Anticoagulants:  apixaban 5 milliGRAM(s) Oral every 12 hours    Cardiac:  amLODIPine   Tablet 5 milliGRAM(s) Oral daily  furosemide    Tablet 40 milliGRAM(s) Oral daily  lisinopril 40 milliGRAM(s) Oral daily      Allergies    No Known Allergies    Intolerances        Vital Signs Last 24 Hrs  T(C): 36.6 (06 Nov 2020 05:25), Max: 36.9 (05 Nov 2020 13:27)  T(F): 97.9 (06 Nov 2020 05:25), Max: 98.5 (05 Nov 2020 13:27)  HR: 94 (06 Nov 2020 05:25) (85 - 94)  BP: 111/73 (06 Nov 2020 05:25) (111/73 - 131/61)  BP(mean): --  RR: 17 (06 Nov 2020 05:25) (17 - 18)  SpO2: 91% (06 Nov 2020 05:25) (91% - 98%)      PHYSICAL EXAM:  Constitutional: WDWN  HEENT: NC/AT; PERRL, anicteric sclera; MMM  Neck: supple  Cardiovascular: +S1/S2, RRR  Respiratory: CTA B/L; no W/R/R  Gastrointestinal: soft, NT/ND; +BSx4  Extremities: WWP; no edema, clubbing or cyanosis  Vascular: 2+ radial, DP/PT pulses B/L  Neurological: AAOx3; no focal deficits    LABS:      CBC Full  -  ( 05 Nov 2020 07:32 )  WBC Count : 13.48 K/uL  RBC Count : 4.42 M/uL  Hemoglobin : 12.7 g/dL  Hematocrit : 38.1 %  Platelet Count - Automated : 451 K/uL  Mean Cell Volume : 86.2 fl  Mean Cell Hemoglobin : 28.7 pg  Mean Cell Hemoglobin Concentration : 33.3 gm/dL  Auto Neutrophil # : 11.47 K/uL  Auto Lymphocyte # : 0.89 K/uL  Auto Monocyte # : 1.01 K/uL  Auto Eosinophil # : 0.02 K/uL  Auto Basophil # : 0.03 K/uL  Auto Neutrophil % : 85.2 %  Auto Lymphocyte % : 6.6 %  Auto Monocyte % : 7.5 %  Auto Eosinophil % : 0.1 %  Auto Basophil % : 0.2 %    11-05    137  |  94<L>  |  22  ----------------------------<  111<H>  3.4<L>   |  26  |  0.85    Ca    9.2      05 Nov 2020 07:32  Phos  3.5     11-05  Mg     1.8     11-05    TPro  6.9  /  Alb  3.2<L>  /  TBili  0.5  /  DBili  x   /  AST  34  /  ALT  22  /  AlkPhos  369<H>  11-05    PT/INR - ( 05 Nov 2020 07:32 )   PT: 17.4 sec;   INR: 1.48          PTT - ( 05 Nov 2020 07:32 )  PTT:28.7 sec          Culture - Fungal, Body Fluid (collected 11-06-20 @ 00:41)  Source: .Body Fluid R pleural effusion  Preliminary Report (11-06-20 @ 08:30):    Testing in progress    Culture - Acid Fast - Body Fluid w/Smear (collected 11-06-20 @ 00:41)  Source: .Body Fluid R pleural effusion    Culture - Body Fluid with Gram Stain (collected 11-05-20 @ 16:53)  Source: Pleural Fl R pleural effusion  Gram Stain (11-05-20 @ 17:56):    No organisms seen    No WBC's seen.  Preliminary Report (11-06-20 @ 07:50):    No growth to date        RADIOLOGY & ADDITIONAL STUDIES:      POCUS:

## 2020-12-04 NOTE — CONSULT NOTE ADULT - PROBLEM SELECTOR PROBLEM 5
Due to severe dehydration from chronic diarrhoea, hypotension, and contrast induced nephropathy in the background of CKD 2  ATN/JEREMIAH  Continue iv fluids       Encounter for palliative care

## 2020-12-05 LAB
CULTURE RESULTS: SIGNIFICANT CHANGE UP
SPECIMEN SOURCE: SIGNIFICANT CHANGE UP

## 2020-12-26 LAB
CULTURE RESULTS: SIGNIFICANT CHANGE UP
SPECIMEN SOURCE: SIGNIFICANT CHANGE UP

## 2021-06-21 NOTE — ED ADULT TRIAGE NOTE - BMI (KG/M2)
What Type Of Note Output Would You Prefer (Optional)?: Standard Output How Severe Are Your Spot(S)?: mild Have Your Spot(S) Been Treated In The Past?: has not been treated Hpi Title: Evaluation of Skin Lesions Additional History: Places on left eyebrow , right hair line, shoulders,and back. 25.1

## 2021-10-13 ENCOUNTER — APPOINTMENT (OUTPATIENT)
Dept: OPHTHALMOLOGY | Facility: CLINIC | Age: 86
End: 2021-10-13

## 2024-06-04 NOTE — REVIEW OF SYSTEMS
All other review of systems negative, except as noted in HPI [Fatigue] : fatigue [Vision Problems] : vision problems [Negative] : Psychiatric